# Patient Record
Sex: FEMALE | Race: WHITE | NOT HISPANIC OR LATINO | Employment: FULL TIME | URBAN - METROPOLITAN AREA
[De-identification: names, ages, dates, MRNs, and addresses within clinical notes are randomized per-mention and may not be internally consistent; named-entity substitution may affect disease eponyms.]

---

## 2017-01-05 ENCOUNTER — OFFICE VISIT (OUTPATIENT)
Dept: PEDIATRICS | Facility: CLINIC | Age: 57
End: 2017-01-05
Payer: COMMERCIAL

## 2017-01-05 VITALS
SYSTOLIC BLOOD PRESSURE: 110 MMHG | WEIGHT: 190 LBS | BODY MASS INDEX: 31.65 KG/M2 | DIASTOLIC BLOOD PRESSURE: 70 MMHG | HEART RATE: 84 BPM | OXYGEN SATURATION: 98 % | HEIGHT: 65 IN | TEMPERATURE: 98.7 F

## 2017-01-05 DIAGNOSIS — F41.9 ANXIETY: ICD-10-CM

## 2017-01-05 DIAGNOSIS — K21.9 GASTROESOPHAGEAL REFLUX DISEASE, ESOPHAGITIS PRESENCE NOT SPECIFIED: ICD-10-CM

## 2017-01-05 DIAGNOSIS — Z00.00 ROUTINE GENERAL MEDICAL EXAMINATION AT A HEALTH CARE FACILITY: Primary | ICD-10-CM

## 2017-01-05 DIAGNOSIS — E66.9 NON MORBID OBESITY, UNSPECIFIED OBESITY TYPE: ICD-10-CM

## 2017-01-05 DIAGNOSIS — R94.4 ABNORMAL RENAL FUNCTION TEST: ICD-10-CM

## 2017-01-05 DIAGNOSIS — F33.1 MAJOR DEPRESSIVE DISORDER, RECURRENT EPISODE, MODERATE (H): ICD-10-CM

## 2017-01-05 LAB
ALBUMIN SERPL-MCNC: 3.7 G/DL (ref 3.4–5)
ALP SERPL-CCNC: 96 U/L (ref 40–150)
ALT SERPL W P-5'-P-CCNC: 23 U/L (ref 0–50)
ANION GAP SERPL CALCULATED.3IONS-SCNC: 7 MMOL/L (ref 3–14)
AST SERPL W P-5'-P-CCNC: 16 U/L (ref 0–45)
BILIRUB SERPL-MCNC: 0.6 MG/DL (ref 0.2–1.3)
BUN SERPL-MCNC: 16 MG/DL (ref 7–30)
CALCIUM SERPL-MCNC: 9.5 MG/DL (ref 8.5–10.1)
CHLORIDE SERPL-SCNC: 107 MMOL/L (ref 94–109)
CHOLEST SERPL-MCNC: 222 MG/DL
CO2 SERPL-SCNC: 27 MMOL/L (ref 20–32)
CREAT SERPL-MCNC: 1.04 MG/DL (ref 0.52–1.04)
GFR SERPL CREATININE-BSD FRML MDRD: 54 ML/MIN/1.7M2
GLUCOSE SERPL-MCNC: 83 MG/DL (ref 70–99)
HDLC SERPL-MCNC: 54 MG/DL
LDLC SERPL CALC-MCNC: 153 MG/DL
NONHDLC SERPL-MCNC: 168 MG/DL
POTASSIUM SERPL-SCNC: 4.1 MMOL/L (ref 3.4–5.3)
PROT SERPL-MCNC: 7.4 G/DL (ref 6.8–8.8)
SODIUM SERPL-SCNC: 141 MMOL/L (ref 133–144)
TRIGL SERPL-MCNC: 73 MG/DL

## 2017-01-05 PROCEDURE — 99396 PREV VISIT EST AGE 40-64: CPT | Performed by: PEDIATRICS

## 2017-01-05 PROCEDURE — 80061 LIPID PANEL: CPT | Performed by: PEDIATRICS

## 2017-01-05 PROCEDURE — 36415 COLL VENOUS BLD VENIPUNCTURE: CPT | Performed by: PEDIATRICS

## 2017-01-05 PROCEDURE — 80053 COMPREHEN METABOLIC PANEL: CPT | Performed by: PEDIATRICS

## 2017-01-05 RX ORDER — MULTIPLE VITAMINS W/ MINERALS TAB 9MG-400MCG
1 TAB ORAL EVERY EVENING
COMMUNITY

## 2017-01-05 ASSESSMENT — ANXIETY QUESTIONNAIRES
6. BECOMING EASILY ANNOYED OR IRRITABLE: NOT AT ALL
2. NOT BEING ABLE TO STOP OR CONTROL WORRYING: NOT AT ALL
IF YOU CHECKED OFF ANY PROBLEMS ON THIS QUESTIONNAIRE, HOW DIFFICULT HAVE THESE PROBLEMS MADE IT FOR YOU TO DO YOUR WORK, TAKE CARE OF THINGS AT HOME, OR GET ALONG WITH OTHER PEOPLE: NOT DIFFICULT AT ALL
1. FEELING NERVOUS, ANXIOUS, OR ON EDGE: NOT AT ALL
GAD7 TOTAL SCORE: 0
3. WORRYING TOO MUCH ABOUT DIFFERENT THINGS: NOT AT ALL
5. BEING SO RESTLESS THAT IT IS HARD TO SIT STILL: NOT AT ALL
7. FEELING AFRAID AS IF SOMETHING AWFUL MIGHT HAPPEN: NOT AT ALL

## 2017-01-05 ASSESSMENT — PATIENT HEALTH QUESTIONNAIRE - PHQ9: 5. POOR APPETITE OR OVEREATING: NOT AT ALL

## 2017-01-05 NOTE — PATIENT INSTRUCTIONS
Labs today - this is on the first floor now    When you are due for your next refill on wellbutrin, let me know if you are interested in decreasing the dose          Preventive Health Recommendations  Female Ages 50 - 64    Yearly exam: See your health care provider every year in order to  o Review health changes.   o Discuss preventive care.    o Review your medicines if your doctor has prescribed any.      Get a Pap test every three years (unless you have an abnormal result and your provider advises testing more often).    If you get Pap tests with HPV test, you only need to test every 5 years, unless you have an abnormal result.     You do not need a Pap test if your uterus was removed (hysterectomy) and you have not had cancer.    You should be tested each year for STDs (sexually transmitted diseases) if you're at risk.     Have a mammogram every 1 to 2 years.    Have a colonoscopy at age 50, or have a yearly FIT test (stool test). These exams screen for colon cancer.      Have a cholesterol test every 5 years, or more often if advised.    Have a diabetes test (fasting glucose) every three years. If you are at risk for diabetes, you should have this test more often.     If you are at risk for osteoporosis (brittle bone disease), think about having a bone density scan (DEXA).    Shots: Get a flu shot each year. Get a tetanus shot every 10 years.    Nutrition:     Eat at least 5 servings of fruits and vegetables each day.    Eat whole-grain bread, whole-wheat pasta and brown rice instead of white grains and rice.    Talk to your provider about Calcium and Vitamin D.     Lifestyle    Exercise at least 150 minutes a week (30 minutes a day, 5 days a week). This will help you control your weight and prevent disease.    Limit alcohol to one drink per day.    No smoking.     Wear sunscreen to prevent skin cancer.     See your dentist every six months for an exam and cleaning.    See your eye doctor every 1 to 2  years.

## 2017-01-05 NOTE — PROGRESS NOTES
SUBJECTIVE:     CC: Sanjuanita Saul is an 56 year old woman who presents for preventive health visit.     Healthy Habits:    Do you get at least three servings of calcium containing foods daily (dairy, green leafy vegetables, etc.)? No , takes multi vitamin daily     Amount of exercise or daily activities, outside of work: none but active at work     Problems taking medications regularly No    Medication side effects: No    Have you had an eye exam in the past two years? no    Do you see a dentist twice per year? yes  Do you have sleep apnea, excessive snoring or daytime drowsiness?no      Anxiety and depression - well controlled on current medications.   Thinking about decreasing dose of wellbutrin.    Intermittent GERD symptoms in upper esophagus and hiccups - triggered by tomato containing foods.    Has lost weight with new job - walking more [place] [employer] [facility] [place]     Today's PHQ-2 Score:   PHQ-2 ( 1999 [employer]) 1/5/2017 10/8/2012   Q1: Little interest or pleasure in doing things 0 1   Q2: Feeling down, depressed or hopeless 0 1   PHQ-2 Score 0 2       Abuse: Current or Past(Physical, Sexual or Emotional)- No  Do you feel safe in your environment - Yes    Social History   Substance Use Topics     Smoking status: Never Smoker      Smokeless tobacco: Never Used     Alcohol Use: No     The patient does not drink >3 drinks per day nor >7 drinks per week.    Recent Labs   Lab Test  10/08/12   0854  06/30/11   0947   CHOL  218*  188   HDL  48*  39*   LDL  151*  126   TRIG  94  115   CHOLHDLRATIO  4.6  4.8       Reviewed orders with patient.  Reviewed health maintenance and updated orders accordingly - Yes    Mammo Decision Support:  Patient over age 50, mutual decision to screen reflected in health maintenance.    Pertinent mammograms are reviewed under the imaging tab.  History of abnormal Pap smear: no - has not tolerated exam when attempted in the past - has never been sexually active  All Histories reviewed and updated in  "Epic.      ROS:  C: NEGATIVE for fever, chills, change in weight  I: NEGATIVE for worrisome rashes, moles or lesions  E: NEGATIVE for vision changes or irritation  ENT: NEGATIVE for ear, mouth and throat problems  R: NEGATIVE for significant cough or SOB  B: NEGATIVE for masses, tenderness or discharge  CV: NEGATIVE for chest pain, palpitations or peripheral edema  GI: as above  : NEGATIVE for unusual urinary or vaginal symptoms. No vaginal bleeding.  M: NEGATIVE for significant arthralgias or myalgia  N: NEGATIVE for weakness, dizziness or paresthesias  P: NEGATIVE for changes in mood or affect     Problem list, Medication list, Allergies, and Medical/Social/Surgical histories reviewed in [facility] and updated as appropriate.  OBJECTIVE:     /70 mmHg  Pulse 84  Temp(Src) 98.7  F (37.1  C) (Tympanic)  Ht 5' 4.5\" (1.638 m)  Wt 190 lb (86.183 kg)  BMI 32.12 kg/m2  SpO2 98%  LMP 01/01/2010  EXAM:  GENERAL: healthy, alert and no distress  EYES: Eyes grossly normal to inspection, PERRL and conjunctivae and sclerae normal  HENT: ear canals and TM's normal, nose and mouth without ulcers or lesions  NECK: no adenopathy, no asymmetry, masses, or scars and thyroid normal to palpation  RESP: lungs clear to auscultation - no rales, rhonchi or wheezes  BREAST: normal without masses, tenderness or nipple discharge and no palpable axillary masses or adenopathy  CV: regular rate and rhythm, normal S1 S2, no S3 or S4, no murmur, click or rub, no peripheral edema and peripheral pulses strong  ABDOMEN: soft, nontender, no hepatosplenomegaly, no masses and bowel sounds normal  MS: no gross musculoskeletal defects noted, no edema  SKIN: no suspicious lesions or rashes  NEURO: Normal strength and tone, mentation intact and speech normal  PSYCH: mentation appears normal, affect normal/bright    ASSESSMENT/PLAN:         ICD-10-CM    1. Routine general medical examination at a health care facility Z00.00 Lipid panel reflex to " "direct LDL     Comprehensive metabolic panel   2. Major depressive disorder, recurrent episode, moderate (H) F33.1 Well controlled, continue current medications  To alert me when due for next refill and will consider changing wellbutrin to 150mg dosing   3. Anxiety F41.9 See above   4. Gastroesophageal reflux disease, esophagitis presence not specified K21.9 Intermittent and controlled with zantac as needed and avoidance of trigger foods - to alert me if worsening   5. Non morbid obesity, unspecified obesity type E66.9 Continue with increased activity - weight down from last visit       COUNSELING:   Special attention given to:        Regular exercise       Healthy diet/nutrition       Vision screening         reports that she has never smoked. She has never used smokeless tobacco.    Estimated body mass index is 32.12 kg/(m^2) as calculated from the following:    Height as of this encounter: 5' 4.5\" (1.638 m).    Weight as of this encounter: 190 lb (86.183 kg).   Weight management plan: Discussed healthy diet and exercise guidelines and patient will follow up in 12 months in clinic to re-evaluate.    Counseling Resources:  ATP IV Guidelines  Pooled Cohorts Equation Calculator  Breast Cancer Risk Calculator  FRAX Risk Assessment  ICSI Preventive Guidelines  Dietary Guidelines for Americans, 2010  USDA's MyPlate  ASA Prophylaxis  Lung CA Screening    Bernice Calderon MD  [facility]  "

## 2017-01-05 NOTE — MR AVS SNAPSHOT
After Visit Summary   1/5/2017    Sanjuanita Saul    MRN: [id number]           Patient Information     Date Of Birth          1960        Visit Information        Provider Department      1/5/2017 7:40 AM Bernice Calderon MD [facility]        Today's Diagnoses     Routine general medical examination at a health care facility    -  1     Major depressive disorder, recurrent episode, moderate (H)           Care Instructions    Labs today - this is on the first floor now    When you are due for your next refill on wellbutrin, let me know if you are interested in decreasing the dose          Preventive Health Recommendations  Female Ages 50 - 64    Yearly exam: See your health care provider every year in order to  o Review health changes.   o Discuss preventive care.    o Review your medicines if your doctor has prescribed any.      Get a Pap test every three years (unless you have an abnormal result and your provider advises testing more often).    If you get Pap tests with HPV test, you only need to test every 5 years, unless you have an abnormal result.     You do not need a Pap test if your uterus was removed (hysterectomy) and you have not had cancer.    You should be tested each year for STDs (sexually transmitted diseases) if you're at risk.     Have a mammogram every 1 to 2 years.    Have a colonoscopy at age 50, or have a yearly FIT test (stool test). These exams screen for colon cancer.      Have a cholesterol test every 5 years, or more often if advised.    Have a diabetes test (fasting glucose) every three years. If you are at risk for diabetes, you should have this test more often.     If you are at risk for osteoporosis (brittle bone disease), think about having a bone density scan (DEXA).    Shots: Get a flu shot each year. Get a tetanus shot every 10 years.    Nutrition:     Eat at least 5 servings of fruits and vegetables each day.    Eat whole-grain bread, whole-wheat  "pasta and brown rice instead of white grains and rice.    Talk to your provider about Calcium and Vitamin D.     Lifestyle    Exercise at least 150 minutes a week (30 minutes a day, 5 days a week). This will help you control your weight and prevent disease.    Limit alcohol to one drink per day.    No smoking.     Wear sunscreen to prevent skin cancer.     See your dentist every six months for an exam and cleaning.    See your eye doctor every 1 to 2 years.            Follow-ups after your visit        Who to contact     If you have questions or need follow up information about today's clinic visit or your schedule please contact [facility] directly at [phone].  Normal or non-critical lab and imaging results will be communicated to you by Varoliihart, letter or phone within 4 business days after the clinic has received the results. If you do not hear from us within 7 days, please contact the clinic through AdStaget or phone. If you have a critical or abnormal lab result, we will notify you by phone as soon as possible.  Submit refill requests through Directr or call your pharmacy and they will forward the refill request to us. Please allow 3 business days for your refill to be completed.          Additional Information About Your Visit        Directr Information     Directr gives you secure access to your electronic health record. If you see a primary care provider, you can also send messages to your care team and make appointments. If you have questions, please call your primary care clinic.  If you do not have a primary care provider, please call [phone] and they will assist you.        Care EveryWhere ID     This is your Care EveryWhere ID. This could be used by other organizations to access your [facility] medical records  [id number]        Your Vitals Were     Pulse Temperature Height BMI (Body Mass Index) Pulse Oximetry Last Period    84 98.7  F (37.1  C) (Tympanic) 5' 4.5\" (1.638 m) 32.12 " kg/m2 98% 01/01/2010       Blood Pressure from Last 3 Encounters:   01/05/17 110/70   07/11/16 120/82   06/25/15 122/82    Weight from Last 3 Encounters:   01/05/17 190 lb (86.183 kg)   07/11/16 198 lb (89.812 kg)   06/25/15 201 lb (91.173 kg)              We Performed the Following     Comprehensive metabolic panel     Lipid panel reflex to direct LDL        Primary Care Provider Office Phone # Fax #    Bernice Calderon [phone]       [facility] [address] [place] [address]        Thank you!     Thank you for choosing [facility]  for your care. Our goal is always to provide you with excellent care. Hearing back from our patients is one way we can continue to improve our services. Please take a few minutes to complete the written survey that you may receive in the mail after your visit with us. Thank you!             Your Updated Medication List - Protect others around you: Learn how to safely use, store and throw away your medicines at www.disposemymeds.org.          This list is accurate as of: 1/5/17  8:07 AM.  Always use your most recent med list.                   Brand Name Dispense Instructions for use    aspirin 325 MG tablet      2 TABLETS EVERY 4 TO 6 HOURS AS NEEDED       buPROPion 300 MG 24 hr tablet    WELLBUTRIN XL    90 tablet    Take 1 tablet (300 mg) by mouth every morning       escitalopram 20 MG tablet    LEXAPRO    90 tablet    Take 1 tablet (20 mg) by mouth daily       Multi-vitamin Tabs tablet      Take 1 tablet by mouth daily       OMEGA-3 FISH OIL PO          pseudoePHEDrine 30 MG tablet    SUDAFED    30 tablet    Take  by mouth every 4 hours as needed for congestion.

## 2017-01-05 NOTE — NURSING NOTE
"Chief Complaint   Patient presents with     Physical       Initial /70 mmHg  Pulse 84  Temp(Src) 98.7  F (37.1  C) (Tympanic)  Ht 5' 4.5\" (1.638 m)  Wt 190 lb (86.183 kg)  BMI 32.12 kg/m2  SpO2 98%  LMP 01/01/2010 Estimated body mass index is 32.12 kg/(m^2) as calculated from the following:    Height as of this encounter: 5' 4.5\" (1.638 m).    Weight as of this encounter: 190 lb (86.183 kg).  BP completed using cuff size: large    "

## 2017-01-06 ASSESSMENT — PATIENT HEALTH QUESTIONNAIRE - PHQ9: SUM OF ALL RESPONSES TO PHQ QUESTIONS 1-9: 3

## 2017-01-06 ASSESSMENT — ANXIETY QUESTIONNAIRES: GAD7 TOTAL SCORE: 0

## 2017-02-20 DIAGNOSIS — R94.4 ABNORMAL RENAL FUNCTION TEST: ICD-10-CM

## 2017-02-20 PROCEDURE — 80048 BASIC METABOLIC PNL TOTAL CA: CPT | Performed by: PEDIATRICS

## 2017-02-20 PROCEDURE — 36415 COLL VENOUS BLD VENIPUNCTURE: CPT | Performed by: PEDIATRICS

## 2017-02-21 LAB
ANION GAP SERPL CALCULATED.3IONS-SCNC: 8 MMOL/L (ref 3–14)
BUN SERPL-MCNC: 16 MG/DL (ref 7–30)
CALCIUM SERPL-MCNC: 9.4 MG/DL (ref 8.5–10.1)
CHLORIDE SERPL-SCNC: 107 MMOL/L (ref 94–109)
CO2 SERPL-SCNC: 27 MMOL/L (ref 20–32)
CREAT SERPL-MCNC: 0.98 MG/DL (ref 0.52–1.04)
GFR SERPL CREATININE-BSD FRML MDRD: 58 ML/MIN/1.7M2
GLUCOSE SERPL-MCNC: 83 MG/DL (ref 70–99)
POTASSIUM SERPL-SCNC: 4.1 MMOL/L (ref 3.4–5.3)
SODIUM SERPL-SCNC: 142 MMOL/L (ref 133–144)

## 2017-03-09 PROBLEM — E66.9 NON MORBID OBESITY, UNSPECIFIED OBESITY TYPE: Status: ACTIVE | Noted: 2017-01-05

## 2017-03-09 PROBLEM — E66.9 NON MORBID OBESITY, UNSPECIFIED OBESITY TYPE: Status: ACTIVE | Noted: 2017-03-09

## 2017-08-03 DIAGNOSIS — F33.1 MAJOR DEPRESSIVE DISORDER, RECURRENT EPISODE, MODERATE (H): ICD-10-CM

## 2017-08-03 NOTE — TELEPHONE ENCOUNTER
buPROPion (WELLBUTRIN XL) 300 MG       Last Written Prescription Date: 7/11/17  Last Fill Quantity: 90; # refills: 3  Last Office Visit with [facility], [facility] or [facility] prescribing provider:  1/5/17        Last PHQ-9 score on record=   PHQ-9 SCORE 8/3/2017   Total Score -   Total Score MyChart 1 (Minimal depression)   Total Score 1       Lab Results   Component Value Date    AST 16 01/05/2017     Lab Results   Component Value Date    ALT 23 01/05/2017       escitalopram (LEXAPRO) 20 MG     Last Written Prescription Date: 7/11/16  Last Fill Quantity: 90, # refills: 3  Last Office Visit with [facility] primary care provider:  1/5/17        Last PHQ-9 score on record=   PHQ-9 SCORE 8/3/2017   Total Score -   Total Score MyChart 1 (Minimal depression)   Total Score 1

## 2017-08-04 ENCOUNTER — RADIANT APPOINTMENT (OUTPATIENT)
Dept: MAMMOGRAPHY | Facility: CLINIC | Age: 57
End: 2017-08-04
Attending: PEDIATRICS
Payer: COMMERCIAL

## 2017-08-04 DIAGNOSIS — Z12.31 VISIT FOR SCREENING MAMMOGRAM: ICD-10-CM

## 2017-08-04 PROCEDURE — G0202 SCR MAMMO BI INCL CAD: HCPCS | Mod: TC

## 2017-08-07 RX ORDER — BUPROPION HYDROCHLORIDE 300 MG/1
TABLET ORAL
Qty: 90 TABLET | Refills: 3 | OUTPATIENT
Start: 2017-08-07

## 2017-08-07 RX ORDER — ESCITALOPRAM OXALATE 20 MG/1
TABLET ORAL
Qty: 90 TABLET | Refills: 3 | OUTPATIENT
Start: 2017-08-07

## 2017-08-07 NOTE — TELEPHONE ENCOUNTER
Chart review confirms medication was sent 8/3/17 to requesting pharmacy, 6 month supply. Sent back as denied/duplicate.    Vicenta Santo, MSN, RN-BC  Care Coordinator

## 2017-08-15 NOTE — TELEPHONE ENCOUNTER
Looks like there was an E-scribe Error.    Confirmed with pharmacy. They did receive refills.     Kamila RIVERA RN, BSN, PHN  [facility] RN

## 2018-02-15 ENCOUNTER — MYC MEDICAL ADVICE (OUTPATIENT)
Dept: PEDIATRICS | Facility: CLINIC | Age: 58
End: 2018-02-15

## 2018-02-15 DIAGNOSIS — F33.1 MAJOR DEPRESSIVE DISORDER, RECURRENT EPISODE, MODERATE (H): ICD-10-CM

## 2018-02-15 RX ORDER — ESCITALOPRAM OXALATE 20 MG/1
20 TABLET ORAL DAILY
Qty: 30 TABLET | Refills: 0 | Status: SHIPPED | OUTPATIENT
Start: 2018-02-15 | End: 2018-02-28

## 2018-02-15 RX ORDER — BUPROPION HYDROCHLORIDE 300 MG/1
TABLET ORAL
Qty: 30 TABLET | Refills: 0 | Status: SHIPPED | OUTPATIENT
Start: 2018-02-15 | End: 2018-02-28

## 2018-02-15 NOTE — TELEPHONE ENCOUNTER
Patient due for annual office visit & PHQ9.     Prescription refilled x 1 per [facility] Refill Protocol.     Sent Convoe message for patient to schedule appointment.     PHQ-9 SCORE 7/11/2016 1/5/2017 8/3/2017   Total Score - - -   Total Score MyChart - - 1 (Minimal depression)   Total Score 4 3 1

## 2018-02-19 DIAGNOSIS — F33.1 MAJOR DEPRESSIVE DISORDER, RECURRENT EPISODE, MODERATE (H): ICD-10-CM

## 2018-02-23 RX ORDER — ESCITALOPRAM OXALATE 20 MG/1
TABLET ORAL
Qty: 90 TABLET | Refills: 1 | OUTPATIENT
Start: 2018-02-23

## 2018-02-23 RX ORDER — BUPROPION HYDROCHLORIDE 300 MG/1
TABLET ORAL
Qty: 90 TABLET | Refills: 1 | OUTPATIENT
Start: 2018-02-23

## 2018-02-23 NOTE — TELEPHONE ENCOUNTER
escitalopram (LEXAPRO) 20 MG tablet  Rx was sent 02/15/2018 for 30 tabs and 0 refills.   Pharmacy notified via E-prescribe refusal.  Enma Crespo RN, BSN     buPROPion (WELLBUTRIN XL) 300 MG 24 hr tablet  Rx was sent 02/15/2018 for 30 tabs and 0 refills.   Pharmacy notified via E-prescribe refusal.  Enma Crespo RN, BSN     Next 5 appointments (look out 90 days)     Feb 28, 2018  1:40 PM CST   [facility]    [address] [place] [address]  [address]  [facility] [address] [phone]                Enma Crespo RN, BSN

## 2018-02-28 ENCOUNTER — OFFICE VISIT (OUTPATIENT)
Dept: PEDIATRICS | Facility: CLINIC | Age: 58
End: 2018-02-28
Payer: COMMERCIAL

## 2018-02-28 VITALS
DIASTOLIC BLOOD PRESSURE: 80 MMHG | HEIGHT: 64 IN | TEMPERATURE: 98.5 F | WEIGHT: 202 LBS | OXYGEN SATURATION: 95 % | HEART RATE: 99 BPM | BODY MASS INDEX: 34.49 KG/M2 | SYSTOLIC BLOOD PRESSURE: 116 MMHG

## 2018-02-28 DIAGNOSIS — K21.9 GASTROESOPHAGEAL REFLUX DISEASE, ESOPHAGITIS PRESENCE NOT SPECIFIED: ICD-10-CM

## 2018-02-28 DIAGNOSIS — F41.9 ANXIETY: ICD-10-CM

## 2018-02-28 DIAGNOSIS — F33.1 MAJOR DEPRESSIVE DISORDER, RECURRENT EPISODE, MODERATE (H): Primary | ICD-10-CM

## 2018-02-28 PROCEDURE — 99213 OFFICE O/P EST LOW 20 MIN: CPT | Performed by: PEDIATRICS

## 2018-02-28 RX ORDER — ESCITALOPRAM OXALATE 20 MG/1
20 TABLET ORAL DAILY
Qty: 90 TABLET | Refills: 3 | Status: SHIPPED | OUTPATIENT
Start: 2018-02-28 | End: 2019-04-04

## 2018-02-28 RX ORDER — BUPROPION HYDROCHLORIDE 300 MG/1
TABLET ORAL
Qty: 90 TABLET | Refills: 3 | Status: SHIPPED | OUTPATIENT
Start: 2018-02-28 | End: 2018-04-10 | Stop reason: DRUGHIGH

## 2018-02-28 ASSESSMENT — ANXIETY QUESTIONNAIRES
2. NOT BEING ABLE TO STOP OR CONTROL WORRYING: NOT AT ALL
GAD7 TOTAL SCORE: 2
3. WORRYING TOO MUCH ABOUT DIFFERENT THINGS: NOT AT ALL
5. BEING SO RESTLESS THAT IT IS HARD TO SIT STILL: NOT AT ALL
6. BECOMING EASILY ANNOYED OR IRRITABLE: MORE THAN HALF THE DAYS
1. FEELING NERVOUS, ANXIOUS, OR ON EDGE: NOT AT ALL
IF YOU CHECKED OFF ANY PROBLEMS ON THIS QUESTIONNAIRE, HOW DIFFICULT HAVE THESE PROBLEMS MADE IT FOR YOU TO DO YOUR WORK, TAKE CARE OF THINGS AT HOME, OR GET ALONG WITH OTHER PEOPLE: NOT DIFFICULT AT ALL
7. FEELING AFRAID AS IF SOMETHING AWFUL MIGHT HAPPEN: NOT AT ALL

## 2018-02-28 ASSESSMENT — PATIENT HEALTH QUESTIONNAIRE - PHQ9: 5. POOR APPETITE OR OVEREATING: NOT AT ALL

## 2018-02-28 NOTE — LETTER
My Depression Action Plan  Name: Sanjuanita Saul   Date of Birth 1960  Date: 2/28/2018    My doctor: Bernice Calderon   My clinic: [address]  [address]  [facility] [address] [phone]          GREEN    ZONE   Good Control    What it looks like:     Things are going generally well. You have normal up s and down s. You may even feel depressed from time to time, but bad moods usually last less than a day.   What you need to do:  1. Continue to care for yourself (see self care plan)  2. Check your depression survival kit and update it as needed  3. Follow your physician s recommendations including any medication.  4. Do not stop taking medication unless you consult with your physician first.           YELLOW         ZONE Getting Worse    What it looks like:     Depression is starting to interfere with your life.     It may be hard to get out of bed; you may be starting to isolate yourself from others.    Symptoms of depression are starting to last most all day and this has happened for several days.     You may have suicidal thoughts but they are not constant.   What you need to do:     1. Call your care team, your response to treatment will improve if you keep your care team informed of your progress. Yellow periods are signs an adjustment may need to be made.     2. Continue your self-care, even if you have to fake it!    3. Talk to someone in your support network    4. Open up your depression survival kit           RED    ZONE Medical Alert - Get Help    What it looks like:     Depression is seriously interfering with your life.     You may experience these or other symptoms: You can t get out of bed most days, can t work or engage in other necessary activities, you have trouble taking care of basic hygiene, or basic responsibilities, thoughts of suicide or death that will not go away, self-injurious behavior.     What you need to do:  1. Call your  care team and request a same-day appointment. If they are not available (weekends or after hours) call your local crisis line, emergency room or 911.      Electronically signed by: Kerri Yoo, February 28, 2018    Depression Self Care Plan / Survival Kit    Self-Care for Depression  Here s the deal. Your body and mind are really not as separate as most people think.  What you do and think affects how you feel and how you feel influences what you do and think. This means if you do things that people who feel good do, it will help you feel better.  Sometimes this is all it takes.  There is also a place for medication and therapy depending on how severe your depression is, so be sure to consult with your medical provider and/ or Behavioral Health Consultant if your symptoms are worsening or not improving.     In order to better manage my stress, I will:    Exercise  Get some form of exercise, every day. This will help reduce pain and release endorphins, the  feel good  chemicals in your brain. This is almost as good as taking antidepressants!  This is not the same as joining a gym and then never going! (they count on that by the way ) It can be as simple as just going for a walk or doing some gardening, anything that will get you moving.      Hygiene   Maintain good hygiene (Get out of bed in the morning, Make your bed, Brush your teeth, Take a shower, and Get dressed like you were going to work, even if you are unemployed).  If your clothes don't fit try to get ones that do.    Diet  I will strive to eat foods that are good for me, drink plenty of water, and avoid excessive sugar, caffeine, alcohol, and other mood-altering substances.  Some foods that are helpful in depression are: complex carbohydrates, B vitamins, flaxseed, fish or fish oil, fresh fruits and vegetables.    Psychotherapy  I agree to participate in Individual Therapy (if recommended).    Medication  If prescribed medications, I agree to  take them.  Missing doses can result in serious side effects.  I understand that drinking alcohol, or other illicit drug use, may cause potential side effects.  I will not stop my medication abruptly without first discussing it with my provider.    Staying Connected With Others  I will stay in touch with my friends, family members, and my primary care provider/team.    Use your imagination  Be creative.  We all have a creative side; it doesn t matter if it s oil painting, sand castles, or mud pies! This will also kick up the endorphins.    Witness Beauty  (AKA stop and smell the roses) Take a look outside, even in mid-winter. Notice colors, textures. Watch the squirrels and birds.     Service to others  Be of service to others.  There is always someone else in need.  By helping others we can  get out of ourselves  and remember the really important things.  This also provides opportunities for practicing all the other parts of the program.    Humor  Laugh and be silly!  Adjust your TV habits for less news and crime-drama and more comedy.    Control your stress  Try breathing deep, massage therapy, biofeedback, and meditation. Find time to relax each day.     My support system    Clinic Contact:  Phone number:    Contact 1:  Phone number:    Contact 2:  Phone number:    [facility]/:  Phone number:    Therapist:  Phone number:    Local crisis center:    Phone number:    Other community support:  Phone number:

## 2018-02-28 NOTE — MR AVS SNAPSHOT
After Visit Summary   2/28/2018    Sanjuanita Saul    MRN: [id number]           Patient Information     Date Of Birth          1960        Visit Information        Provider Department      2/28/2018 1:40 PM Bernice Calderon MD [facility]        Today's Diagnoses     Major depressive disorder, recurrent episode, moderate (H)          Care Instructions    Continue current medications for now.    Expect a phone call to help schedule with psychiatry.             Follow-ups after your visit        Additional Services     MENTAL HEALTH REFERRAL  - Adult; Psychiatry and Medication Management; Psychiatry; Other: [facility] [phone]; We will contact you to schedule the appointment or please call with any questions       All scheduling is subject to the client's specific insurance plan & benefits, provider/location availability, and provider clinical specialities.  Please arrive 15 minutes early for your first appointment and bring your completed paperwork.    Please be aware that coverage of these services is subject to the terms and limitations of your health insurance plan.  Call member services at your health plan with any benefit or coverage questions.                            Who to contact     If you have questions or need follow up information about today's clinic visit or your schedule please contact [facility] directly at [phone].  Normal or non-critical lab and imaging results will be communicated to you by MyChart, letter or phone within 4 business days after the clinic has received the results. If you do not hear from us within 7 days, please contact the clinic through MyChart or phone. If you have a critical or abnormal lab result, we will notify you by phone as soon as possible.  Submit refill requests through [employer] or call your pharmacy and they will forward the refill request to us. Please allow 3 business days for your refill to  "be completed.          Additional Information About Your Visit        MemoryMergehart Information     [employer] gives you secure access to your electronic health record. If you see a primary care provider, you can also send messages to your care team and make appointments. If you have questions, please call your primary care clinic.  If you do not have a primary care provider, please call [phone] and they will assist you.        Care EveryWhere ID     This is your Care EveryWhere ID. This could be used by other organizations to access your [place] medical records  [id number]        Your Vitals Were     Pulse Temperature Height Last Period Pulse Oximetry BMI (Body Mass Index)    99 98.5  F (36.9  C) (Tympanic) 5' 3.5\" (1.613 m) 01/01/2010 95% 35.22 kg/m2       Blood Pressure from Last 3 Encounters:   02/28/18 116/80   01/05/17 110/70   07/11/16 120/82    Weight from Last 3 Encounters:   02/28/18 202 lb (91.6 kg)   01/05/17 190 lb (86.2 kg)   07/11/16 198 lb (89.8 kg)              We Performed the Following     DEPRESSION ACTION PLAN (DAP)     MENTAL HEALTH REFERRAL  - Adult; Psychiatry and Medication Management; Psychiatry; Other: [facility] [phone]; We will contact you to schedule the appointment or please call with any questions          Where to get your medicines      These medications were sent to [facility] - [address], [facility] [address]     Phone:  [phone]     buPROPion 300 MG 24 hr tablet    escitalopram 20 MG tablet          Primary Care Provider Office Phone # Fax #    Bernice Calderon [phone] [address] [place] [address]        Equal Access to Services     CYNTHIA GUILLEN AH: Brittney Zaragoza, cadence escoto, jaiden dent. So [facility] [phone].    ATENCIÓN: Si chrissy sargent, " tiene a kamara disposición servicios gratuitos de asistencia lingüística. Clara lopez [phone].    We comply with applicable federal civil rights laws and [place] laws. We do not discriminate on the basis of race, color, national origin, age, disability, sex, sexual orientation, or gender identity.            Thank you!     Thank you for choosing [facility]  for your care. Our goal is always to provide you with excellent care. Hearing back from our patients is one way we can continue to improve our services. Please take a few minutes to complete the written survey that you may receive in the mail after your visit with us. Thank you!             Your Updated Medication List - Protect others around you: Learn how to safely use, store and throw away your medicines at www.disposemymeds.org.          This list is accurate as of 2/28/18  2:11 PM.  Always use your most recent med list.                   Brand Name Dispense Instructions for use Diagnosis    aspirin 325 MG tablet      2 TABLETS EVERY 4 TO 6 HOURS AS NEEDED        buPROPion 300 MG 24 hr tablet    WELLBUTRIN XL    90 tablet    Take 1 tablet (300 mg) by mouth daily every morning.    Major depressive disorder, recurrent episode, moderate (H)       escitalopram 20 MG tablet    LEXAPRO    90 tablet    Take 1 tablet (20 mg) by mouth daily    Major depressive disorder, recurrent episode, moderate (H)       Multi-vitamin Tabs tablet      Take 1 tablet by mouth daily        OMEGA-3 FISH OIL PO           pseudoePHEDrine 30 MG tablet    SUDAFED    30 tablet    Take  by mouth every 4 hours as needed for congestion.

## 2018-02-28 NOTE — PROGRESS NOTES
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    Hasn't been seeing her therapist recently, but feels that she is doing well overall    Significant life event: No     Current substance abuse: None    PHQ-9 1/5/2017 8/3/2017 2/28/2018   Total Score 3 1 2   Q9: Suicide Ideation Not at all Not at all Not at all     YURIY-7 SCORE 7/11/2016 1/5/2017 2/28/2018   Total Score - - -   Total Score 0 0 2     In the past two weeks have you had thoughts of suicide or self-harm?  No.    Do you have concerns about your personal safety or the safety of others?   No  PHQ-9  English  PHQ-9   Any Language  YURIY-7  Suicide Assessment Five-step Evaluation and Treatment (SAFE-T)    Amount of exercise or physical activity: None    Problems taking medications regularly: No    Medication side effects: none    Diet: regular (no restrictions)        Self care has been more difficult with intense school and work schedule.  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    Having some increase in reflux symptoms recently.  More burping.  Resolves with tums.  Intermittent zantac use.    Wt Readings from Last 4 Encounters:   02/28/18 202 lb (91.6 kg)   01/05/17 190 lb (86.2 kg)   07/11/16 198 lb (89.8 kg)   06/25/15 201 lb (91.2 kg)     Weight up about 12 pounds from our last visit.    Still enjoys seeing her horse and visits her weekly.    Problem list and histories reviewed & adjusted, as indicated.  Additional history:  "as documented      Reviewed and updated as needed this visit by clinical staff       Reviewed and updated as needed this visit by Provider         OBJECTIVE:     /80 (BP Location: Right arm, Patient Position: Right side, Cuff Size: Adult Large)  Pulse 99  Temp 98.5  F (36.9  C) (Tympanic)  Ht 5' 3.5\" (1.613 m)  Wt 202 lb (91.6 kg)  LMP 01/01/2010  SpO2 95%  BMI 35.22 kg/m2  Body mass index is 35.22 kg/(m^2).  GENERAL: alert and no distress  PSYCH: mentation appears normal, affect normal/bright      ASSESSMENT/PLAN:       ICD-10-CM    1. Major depressive disorder, recurrent episode, moderate (H) F33.1 buPROPion (WELLBUTRIN XL) 300 MG 24 hr tablet     escitalopram (LEXAPRO) 20 MG tablet     MENTAL HEALTH REFERRAL  - Adult; Psychiatry and Medication Management; Psychiatry; Other: [facility] [phone]; We will contact you to schedule the appointment or please call with any questions    Continue current medications for now.  Anticipate increase in self care once done with school.  Referral placed for psychiatry per patient request for medication review.   2. Anxiety F41.9 See above - continue current medications - well controlled   3. Gastroesophageal reflux disease, esophagitis presence not specified K21.9 Intermittent, discussed dietary changes, OTC medications to use.  To alert me if worsening.         Bernice Calderon MD  [facility]  "

## 2018-03-01 ASSESSMENT — PATIENT HEALTH QUESTIONNAIRE - PHQ9: SUM OF ALL RESPONSES TO PHQ QUESTIONS 1-9: 2

## 2018-03-01 ASSESSMENT — ANXIETY QUESTIONNAIRES: GAD7 TOTAL SCORE: 2

## 2018-04-08 ENCOUNTER — MYC MEDICAL ADVICE (OUTPATIENT)
Dept: PEDIATRICS | Facility: CLINIC | Age: 58
End: 2018-04-08

## 2018-04-08 DIAGNOSIS — F32.1 MODERATE MAJOR DEPRESSION (H): Primary | ICD-10-CM

## 2018-04-10 ENCOUNTER — MYC MEDICAL ADVICE (OUTPATIENT)
Dept: PEDIATRICS | Facility: CLINIC | Age: 58
End: 2018-04-10

## 2018-04-10 RX ORDER — BUPROPION HYDROCHLORIDE 150 MG/1
150 TABLET ORAL EVERY MORNING
Qty: 90 TABLET | Refills: 3 | Status: SHIPPED | OUTPATIENT
Start: 2018-04-10 | End: 2021-02-24

## 2018-04-10 NOTE — TELEPHONE ENCOUNTER
Per Dash[employer] message, patient is on 450mg of Bupropion as increased by Candie Holder and [facility].     Per patient, that provider will not take over prescribing.    Please advise if you would be managing medication at this time. Or if we should obtain records. Patient will need a refill soon    Kamila RIVERA RN, BSN, PHN  [place] [facility] RN

## 2018-08-27 ENCOUNTER — RADIANT APPOINTMENT (OUTPATIENT)
Dept: MAMMOGRAPHY | Facility: CLINIC | Age: 58
End: 2018-08-27
Payer: COMMERCIAL

## 2018-08-27 DIAGNOSIS — Z12.31 VISIT FOR SCREENING MAMMOGRAM: ICD-10-CM

## 2018-08-27 PROCEDURE — 77067 SCR MAMMO BI INCL CAD: CPT | Mod: TC

## 2019-04-04 ENCOUNTER — MYC MEDICAL ADVICE (OUTPATIENT)
Dept: PEDIATRICS | Facility: CLINIC | Age: 59
End: 2019-04-04

## 2019-04-04 ENCOUNTER — OFFICE VISIT (OUTPATIENT)
Dept: PEDIATRICS | Facility: CLINIC | Age: 59
End: 2019-04-04
Payer: COMMERCIAL

## 2019-04-04 VITALS
HEIGHT: 64 IN | BODY MASS INDEX: 33.29 KG/M2 | HEART RATE: 86 BPM | SYSTOLIC BLOOD PRESSURE: 128 MMHG | WEIGHT: 195 LBS | OXYGEN SATURATION: 97 % | DIASTOLIC BLOOD PRESSURE: 86 MMHG | TEMPERATURE: 96.8 F

## 2019-04-04 DIAGNOSIS — Z23 NEED FOR SHINGLES VACCINE: ICD-10-CM

## 2019-04-04 DIAGNOSIS — Z23 NEED FOR TDAP VACCINATION: ICD-10-CM

## 2019-04-04 DIAGNOSIS — Z12.11 COLON CANCER SCREENING: Primary | ICD-10-CM

## 2019-04-04 DIAGNOSIS — K21.9 GASTROESOPHAGEAL REFLUX DISEASE, ESOPHAGITIS PRESENCE NOT SPECIFIED: ICD-10-CM

## 2019-04-04 DIAGNOSIS — Z00.00 ROUTINE HISTORY AND PHYSICAL EXAMINATION OF ADULT: Primary | ICD-10-CM

## 2019-04-04 DIAGNOSIS — F32.1 MODERATE MAJOR DEPRESSION (H): ICD-10-CM

## 2019-04-04 DIAGNOSIS — F41.9 ANXIETY: ICD-10-CM

## 2019-04-04 DIAGNOSIS — R03.0 ELEVATED BLOOD PRESSURE READING WITHOUT DIAGNOSIS OF HYPERTENSION: ICD-10-CM

## 2019-04-04 DIAGNOSIS — F33.1 MAJOR DEPRESSIVE DISORDER, RECURRENT EPISODE, MODERATE (H): ICD-10-CM

## 2019-04-04 PROCEDURE — 90472 IMMUNIZATION ADMIN EACH ADD: CPT | Performed by: PEDIATRICS

## 2019-04-04 PROCEDURE — 90750 HZV VACC RECOMBINANT IM: CPT | Performed by: PEDIATRICS

## 2019-04-04 PROCEDURE — 90471 IMMUNIZATION ADMIN: CPT | Performed by: PEDIATRICS

## 2019-04-04 PROCEDURE — 90715 TDAP VACCINE 7 YRS/> IM: CPT | Performed by: PEDIATRICS

## 2019-04-04 PROCEDURE — 99396 PREV VISIT EST AGE 40-64: CPT | Mod: 25 | Performed by: PEDIATRICS

## 2019-04-04 RX ORDER — ESCITALOPRAM OXALATE 20 MG/1
20 TABLET ORAL DAILY
Qty: 90 TABLET | Refills: 3 | Status: SHIPPED | OUTPATIENT
Start: 2019-04-04 | End: 2020-05-13

## 2019-04-04 RX ORDER — BUPROPION HYDROCHLORIDE 300 MG/1
300 TABLET ORAL EVERY MORNING
Qty: 90 TABLET | Refills: 3 | Status: SHIPPED | OUTPATIENT
Start: 2019-04-04 | End: 2020-05-13

## 2019-04-04 ASSESSMENT — ENCOUNTER SYMPTOMS
BREAST MASS: 0
PALPITATIONS: 0
CHILLS: 0
EYE PAIN: 0
PARESTHESIAS: 0
DIZZINESS: 0
WEAKNESS: 0
CONSTIPATION: 0
HEMATOCHEZIA: 0
SORE THROAT: 0
DIARRHEA: 0
HEMATURIA: 0
ABDOMINAL PAIN: 0
COUGH: 0
SHORTNESS OF BREATH: 0

## 2019-04-04 ASSESSMENT — ANXIETY QUESTIONNAIRES
1. FEELING NERVOUS, ANXIOUS, OR ON EDGE: NOT AT ALL
2. NOT BEING ABLE TO STOP OR CONTROL WORRYING: NOT AT ALL
6. BECOMING EASILY ANNOYED OR IRRITABLE: NOT AT ALL
7. FEELING AFRAID AS IF SOMETHING AWFUL MIGHT HAPPEN: NOT AT ALL
IF YOU CHECKED OFF ANY PROBLEMS ON THIS QUESTIONNAIRE, HOW DIFFICULT HAVE THESE PROBLEMS MADE IT FOR YOU TO DO YOUR WORK, TAKE CARE OF THINGS AT HOME, OR GET ALONG WITH OTHER PEOPLE: NOT DIFFICULT AT ALL
5. BEING SO RESTLESS THAT IT IS HARD TO SIT STILL: NOT AT ALL
3. WORRYING TOO MUCH ABOUT DIFFERENT THINGS: NOT AT ALL
GAD7 TOTAL SCORE: 0

## 2019-04-04 ASSESSMENT — PATIENT HEALTH QUESTIONNAIRE - PHQ9
5. POOR APPETITE OR OVEREATING: NOT AT ALL
SUM OF ALL RESPONSES TO PHQ QUESTIONS 1-9: 2

## 2019-04-04 ASSESSMENT — MIFFLIN-ST. JEOR: SCORE: 1443.48

## 2019-04-04 NOTE — PROGRESS NOTES
SUBJECTIVE:   CC: Sanjuanita Saul is an 58 year old woman who presents for preventive health visit.     Physical   Annual:     Getting at least 3 servings of Calcium per day:  Yes    Bi-annual eye exam:  Yes    Dental care twice a year:  Yes    Sleep apnea or symptoms of sleep apnea:  None    Diet:  Regular (no restrictions)    Frequency of exercise:  2-3 days/week    Duration of exercise:  15-30 minutes    Taking medications regularly:  Yes    Medication side effects:  None    Additional concerns today:  YES (Discuss possible HTN if time allows.)    PHQ-2 Total Score: 0        Today's PHQ-2 Score:   PHQ-2 ( 1999 Pfizer) 4/4/2019   Q1: Little interest or pleasure in doing things 0   Q2: Feeling down, depressed or hopeless 0   PHQ-2 Score 0   Q1: Little interest or pleasure in doing things Not at all   Q2: Feeling down, depressed or hopeless Not at all   PHQ-2 Score 0       Abuse: Current or Past(Physical, Sexual or Emotional)- No  Do you feel safe in your environment? Yes    Social History     Tobacco Use     Smoking status: Never Smoker     Smokeless tobacco: Never Used   Substance Use Topics     Alcohol use: No     Alcohol Use 4/4/2019   If you drink alcohol do you typically have greater than 3 drinks per day OR greater than 7 drinks per week? Not Applicable       Reviewed orders with patient.  Reviewed health maintenance and updated orders accordingly - Yes    Mammogram Screening: Patient over age 50, mutual decision to screen reflected in health maintenance.    Pertinent mammograms are reviewed under the imaging tab.  History of abnormal Pap smear: No - all normal in the past - patient has not been sexually active and declines additional pap smears at this time     Reviewed and updated as needed this visit by clinical staff  Tobacco  Allergies  Meds  Med Hx  Surg Hx  Fam Hx  Soc Hx        Reviewed and updated as needed this visit by Provider          Depression/anxiety - had been on wellbutrin 450 - cut  "down due to 300mg due to bp concerns.   Has had evaluations through psychiatry for consultation, then I have been prescribing medications.  Mood has been in a good place.  PHQ-9 SCORE 8/3/2017 2/28/2018 4/4/2019   PHQ-9 Total Score - - -   PHQ-9 Total Score Jennyhart 1 (Minimal depression) - -   PHQ-9 Total Score 1 2 2     YURIY-7 SCORE 1/5/2017 2/28/2018 4/4/2019   Total Score - - -   Total Score 0 2 0           Blood pressure - has noted an increase recently.   Notes that oldest sister and brother both have afib.  Has cut down on caffeine.  Working on exercise, though knee pain is limiting.       Knee injury at work - went in for referral and bp was high  135/93, recheck - 135/95, last week - 160/101    GERD - more belching when gets home from work.  Uses zantac as needed at night          Review of Systems   Constitutional: Negative for chills.   HENT: Negative for congestion, ear pain and sore throat.    Eyes: Negative for pain and visual disturbance.   Respiratory: Negative for cough and shortness of breath.    Cardiovascular: Negative for chest pain and palpitations.   Gastrointestinal: Negative for abdominal pain, constipation, diarrhea and hematochezia.   Breasts:  Negative for tenderness, breast mass and discharge.   Genitourinary: Negative for hematuria, pelvic pain, urgency, vaginal bleeding and vaginal discharge.   Skin: Negative for rash.   Neurological: Negative for dizziness, weakness and paresthesias.        OBJECTIVE:   /86   Pulse 86   Temp 96.8  F (36  C) (Tympanic)   Ht 1.632 m (5' 4.25\")   Wt 88.5 kg (195 lb)   LMP 01/01/2010   SpO2 97%   BMI 33.21 kg/m     Wt Readings from Last 4 Encounters:   04/04/19 88.5 kg (195 lb)   02/28/18 91.6 kg (202 lb)   01/05/17 86.2 kg (190 lb)   07/11/16 89.8 kg (198 lb)       Physical Exam  GENERAL: healthy, alert and no distress  EYES: Eyes grossly normal to inspection, PERRL and conjunctivae and sclerae normal  HENT: ear canals and TM's normal, nose " and mouth without ulcers or lesions  NECK: no adenopathy, no asymmetry, masses, or scars and thyroid normal to palpation  RESP: lungs clear to auscultation - no rales, rhonchi or wheezes  BREAST: normal without masses, tenderness or nipple discharge and no palpable axillary masses or adenopathy  CV: regular rate and rhythm, normal S1 S2, no S3 or S4, no murmur, click or rub, no peripheral edema and peripheral pulses strong  ABDOMEN: soft, nontender, no hepatosplenomegaly, no masses and bowel sounds normal  MS: no gross musculoskeletal defects noted, no edema  SKIN: no suspicious lesions or rashes  NEURO: Normal strength and tone, mentation intact and speech normal  PSYCH: mentation appears normal, affect normal/bright    Diagnostic Test Results:  Future pending    ASSESSMENT/PLAN:       ICD-10-CM    1. Routine history and physical examination of adult Z00.00 Lipid panel reflex to direct LDL Fasting     Comprehensive metabolic panel     TSH with free T4 reflex   2. Gastroesophageal reflux disease, esophagitis presence not specified K21.9 Continue to use zantac as needed   3. Moderate major depression (H) F32.1 buPROPion (WELLBUTRIN XL) 300 MG 24 hr tablet  Doing well, now on lower dose of wellbutrin, previously benefited from 450mg dosing - monitor   4. Anxiety F41.9 buPROPion (WELLBUTRIN XL) 300 MG 24 hr tablet  See above   5. Need for shingles vaccine Z23      EA ADD'L VACCINE   6. Need for Tdap vaccination Z23 TDAP VACCINE (ADACEL)     ADMIN 1st VACCINE   7. Elevated blood pressure reading without diagnosis of hypertension R03.0 Lipid panel reflex to direct LDL Fasting     Comprehensive metabolic panel     TSH with free T4 reflex     Albumin Random Urine Quantitative with Creat Ratio    Labs and follow up with patient in clinic in next few weeks for recheck   8. Major depressive disorder, recurrent episode, moderate (H) F33.1 escitalopram (LEXAPRO) 20 MG tablet  Well controlled, continue current medications    "      COUNSELING:  Reviewed preventive health counseling, as reflected in patient instructions    BP Readings from Last 1 Encounters:   04/04/19 128/86     Estimated body mass index is 33.21 kg/m  as calculated from the following:    Height as of this encounter: 1.632 m (5' 4.25\").    Weight as of this encounter: 88.5 kg (195 lb).    BP Screening:   Last 3 BP Readings:    BP Readings from Last 3 Encounters:   04/04/19 128/86   02/28/18 116/80   01/05/17 110/70       The following was recommended to the patient:  Re-screen BP within a year and recommended lifestyle modifications  Weight management plan: Discussed healthy diet and exercise guidelines     reports that  has never smoked. she has never used smokeless tobacco.      Counseling Resources:  ATP IV Guidelines  Pooled Cohorts Equation Calculator  Breast Cancer Risk Calculator  FRAX Risk Assessment  ICSI Preventive Guidelines  Dietary Guidelines for Americans, 2010  USDA's MyPlate  ASA Prophylaxis  Lung CA Screening    Bernice Calderon MD  [facility]  "

## 2019-04-04 NOTE — PATIENT INSTRUCTIONS
SPRINT trial - look it up and see what you think    I look forward to seeing you in 2 weeks    Tdap and Shingrix today    Medication refills sent to pharmacy                    Preventive Health Recommendations  Female Ages 50 - 64    Yearly exam: See your health care provider every year in order to  o Review health changes.   o Discuss preventive care.    o Review your medicines if your doctor has prescribed any.      Get a Pap test every three years (unless you have an abnormal result and your provider advises testing more often).    If you get Pap tests with HPV test, you only need to test every 5 years, unless you have an abnormal result.     You do not need a Pap test if your uterus was removed (hysterectomy) and you have not had cancer.    You should be tested each year for STDs (sexually transmitted diseases) if you're at risk.     Have a mammogram every 1 to 2 years.    Have a colonoscopy at age 50, or have a yearly FIT test (stool test). These exams screen for colon cancer.      Have a cholesterol test every 5 years, or more often if advised.    Have a diabetes test (fasting glucose) every three years. If you are at risk for diabetes, you should have this test more often.     If you are at risk for osteoporosis (brittle bone disease), think about having a bone density scan (DEXA).    Shots: Get a flu shot each year. Get a tetanus shot every 10 years.    Nutrition:     Eat at least 5 servings of fruits and vegetables each day.    Eat whole-grain bread, whole-wheat pasta and brown rice instead of white grains and rice.    Get adequate Calcium and Vitamin D.     Lifestyle    Exercise at least 150 minutes a week (30 minutes a day, 5 days a week). This will help you control your weight and prevent disease.    Limit alcohol to one drink per day.    No smoking.     Wear sunscreen to prevent skin cancer.     See your dentist every six months for an exam and cleaning.    See your eye doctor every 1 to 2 years.

## 2019-04-05 ASSESSMENT — ANXIETY QUESTIONNAIRES: GAD7 TOTAL SCORE: 0

## 2019-04-05 NOTE — TELEPHONE ENCOUNTER
Last colonoscopy 6/1/2009.  Patient will be due this June.  Last physical 4/4/19.  Order for colonoscopy screening placed with cosign.  Patient advised about due date.   Libertad Hansen RN  Message handled by Nurse Triage.

## 2019-04-16 DIAGNOSIS — Z00.00 ROUTINE HISTORY AND PHYSICAL EXAMINATION OF ADULT: ICD-10-CM

## 2019-04-16 DIAGNOSIS — R03.0 ELEVATED BLOOD PRESSURE READING WITHOUT DIAGNOSIS OF HYPERTENSION: ICD-10-CM

## 2019-04-16 LAB
ALBUMIN SERPL-MCNC: 3.5 G/DL (ref 3.4–5)
ALP SERPL-CCNC: 87 U/L (ref 40–150)
ALT SERPL W P-5'-P-CCNC: 24 U/L (ref 0–50)
ANION GAP SERPL CALCULATED.3IONS-SCNC: 8 MMOL/L (ref 3–14)
AST SERPL W P-5'-P-CCNC: 23 U/L (ref 0–45)
BILIRUB SERPL-MCNC: 0.6 MG/DL (ref 0.2–1.3)
BUN SERPL-MCNC: 15 MG/DL (ref 7–30)
CALCIUM SERPL-MCNC: 9 MG/DL (ref 8.5–10.1)
CHLORIDE SERPL-SCNC: 109 MMOL/L (ref 94–109)
CHOLEST SERPL-MCNC: 194 MG/DL
CO2 SERPL-SCNC: 25 MMOL/L (ref 20–32)
CREAT SERPL-MCNC: 0.99 MG/DL (ref 0.52–1.04)
CREAT UR-MCNC: 183 MG/DL
GFR SERPL CREATININE-BSD FRML MDRD: 62 ML/MIN/{1.73_M2}
GLUCOSE SERPL-MCNC: 87 MG/DL (ref 70–99)
HDLC SERPL-MCNC: 45 MG/DL
LDLC SERPL CALC-MCNC: 134 MG/DL
MICROALBUMIN UR-MCNC: 12 MG/L
MICROALBUMIN/CREAT UR: 6.61 MG/G CR (ref 0–25)
NONHDLC SERPL-MCNC: 149 MG/DL
POTASSIUM SERPL-SCNC: 3.9 MMOL/L (ref 3.4–5.3)
PROT SERPL-MCNC: 7.2 G/DL (ref 6.8–8.8)
SODIUM SERPL-SCNC: 142 MMOL/L (ref 133–144)
TRIGL SERPL-MCNC: 73 MG/DL
TSH SERPL DL<=0.005 MIU/L-ACNC: 2.42 MU/L (ref 0.4–4)

## 2019-04-16 PROCEDURE — 82043 UR ALBUMIN QUANTITATIVE: CPT | Performed by: PEDIATRICS

## 2019-04-16 PROCEDURE — 84443 ASSAY THYROID STIM HORMONE: CPT | Performed by: PEDIATRICS

## 2019-04-16 PROCEDURE — 36415 COLL VENOUS BLD VENIPUNCTURE: CPT | Performed by: PEDIATRICS

## 2019-04-16 PROCEDURE — 80061 LIPID PANEL: CPT | Performed by: PEDIATRICS

## 2019-04-16 PROCEDURE — 80053 COMPREHEN METABOLIC PANEL: CPT | Performed by: PEDIATRICS

## 2019-04-17 ENCOUNTER — OFFICE VISIT (OUTPATIENT)
Dept: PEDIATRICS | Facility: CLINIC | Age: 59
End: 2019-04-17
Payer: COMMERCIAL

## 2019-04-17 VITALS
OXYGEN SATURATION: 98 % | HEIGHT: 64 IN | BODY MASS INDEX: 33.24 KG/M2 | TEMPERATURE: 97.1 F | SYSTOLIC BLOOD PRESSURE: 138 MMHG | HEART RATE: 85 BPM | WEIGHT: 194.7 LBS | DIASTOLIC BLOOD PRESSURE: 82 MMHG

## 2019-04-17 DIAGNOSIS — I10 ESSENTIAL HYPERTENSION: Primary | ICD-10-CM

## 2019-04-17 DIAGNOSIS — F32.1 MODERATE MAJOR DEPRESSION (H): ICD-10-CM

## 2019-04-17 PROCEDURE — 99213 OFFICE O/P EST LOW 20 MIN: CPT | Performed by: PEDIATRICS

## 2019-04-17 RX ORDER — AMLODIPINE BESYLATE 5 MG/1
5 TABLET ORAL DAILY
Qty: 30 TABLET | Refills: 3 | Status: SHIPPED | OUTPATIENT
Start: 2019-04-17 | End: 2019-05-01

## 2019-04-17 ASSESSMENT — MIFFLIN-ST. JEOR: SCORE: 1442.12

## 2019-04-17 NOTE — PATIENT INSTRUCTIONS
Start amlodipine    Send me blood pressure updates via [employer] in about 3 weeks, contact me sooner with problems    Once bp lower, we can plan to restart the additional 150mg of wellbutrin daily.

## 2019-04-17 NOTE — PROGRESS NOTES
"  SUBJECTIVE:   Sanjuanita Saul is a 58 year old female who presents to clinic today for the following   health issues:      Pt is here for a Blood pressure follow up. Pt is not taking BP at home denies any sx    Patient is here to follow-up blood pressure.  We discussed this at her recent physical.  There have been some concern that her high dose of Wellbutrin was contributing to her high blood pressure.  She had decreased her dose of Wellbutrin from 450 mg daily down to 300 mg daily.  Her blood pressure has remained elevated in the 130s-140s over 80s.  She denies any symptoms related to high blood pressure, with no new cardiac or neurologic symptoms.  She is open to starting a medication today.        Additional history: as documented    Reviewed  and updated as needed this visit by clinical staff  Tobacco  Allergies  Meds  Med Hx  Surg Hx  Fam Hx  Soc Hx        Reviewed and updated as needed this visit by Provider             ROS:  Constitutional, cv, pulm, psych systems are negative, except as otherwise noted.    OBJECTIVE:     /82 (BP Location: Right arm, Patient Position: Chair, Cuff Size: Adult Regular)   Pulse 85   Temp 97.1  F (36.2  C) (Tympanic)   Ht 1.632 m (5' 4.25\")   Wt 88.3 kg (194 lb 11.2 oz)   LMP 01/01/2010   SpO2 98%   BMI 33.16 kg/m    Body mass index is 33.16 kg/m .  GENERAL: healthy, alert and no distress  PSYCH: mentation appears normal, affect normal/bright    Diagnostic Test Results:  Reviewed from recent physical -normal kidney function and electrolytes, negative urine microalbumin    ASSESSMENT/PLAN:         ICD-10-CM    1. Essential hypertension I10 amLODIPine (NORVASC) 5 MG tablet    Discussed treatment options in clinic today.  Plan together to start low-dose of Norvasc.  Medication risk benefits and side effects reviewed.  Patient has access to RNs who can check her blood pressure work.  She will continue to follow her blood pressure work after starting medication and " send me readings in 3 weeks time.  We will adjust further based on this information.   2. Moderate major depression (H) F32.1  we recently decreased patient's Wellbutrin dosing due to concerns about it contributing to higher blood pressures.  Once blood pressures are under good control, we will plan to increase her Wellbutrin dosing back to 450 mg daily.  Adjustment pending good control of blood pressure.       See Patient Instructions    Bernice Calderon MD  [facility]

## 2019-04-30 ENCOUNTER — MYC MEDICAL ADVICE (OUTPATIENT)
Dept: PEDIATRICS | Facility: CLINIC | Age: 59
End: 2019-04-30

## 2019-04-30 DIAGNOSIS — I10 ESSENTIAL HYPERTENSION: ICD-10-CM

## 2019-05-01 RX ORDER — AMLODIPINE BESYLATE 10 MG/1
10 TABLET ORAL DAILY
Qty: 30 TABLET | Refills: 1 | Status: SHIPPED | OUTPATIENT
Start: 2019-05-01 | End: 2019-07-05

## 2019-05-01 NOTE — TELEPHONE ENCOUNTER
Started Amlodipine 4/19/219.  Reports home readings are 140/92 yesterday.  Reports having some stomach aches/cramping a few times.      BP Readings from Last 3 Encounters:   04/17/19 138/82   04/04/19 128/86   02/28/18 116/80          LOV notes: 4/17/2019  1. Essential hypertension I10 amLODIPine (NORVASC) 5 MG tablet     Discussed treatment options in clinic today.  Plan together to start low-dose of Norvasc.  Medication risk benefits and side effects reviewed.  Patient has access to RNs who can check her blood pressure work.  She will continue to follow her blood pressure work after starting medication and send me readings in 3 weeks time.  We will adjust further based on this information.       Message handled by Nurse Triage with Tory - provider name: Dr. Calderon.  Ok to increase amlodipine to 10 mg daily and continue to monitor and update with readings in 3 weeks.  Stomach symptoms noted not common with amlodipine.    Medication ordered, patient sent message with provider recommendations.  Mikki LANCE RN - Triage  [facility]

## 2019-05-24 ENCOUNTER — MYC MEDICAL ADVICE (OUTPATIENT)
Dept: PEDIATRICS | Facility: CLINIC | Age: 59
End: 2019-05-24

## 2019-05-24 NOTE — TELEPHONE ENCOUNTER
Pt sent a [employer] message update on her BP readings.  On 4/30/19, amlodipine was increased to 10 mg daily.  Pt's BP today was 127/87.    Please advise-    Marly Burt RN - Triage  [facility]

## 2019-06-06 ENCOUNTER — MYC MEDICAL ADVICE (OUTPATIENT)
Dept: PEDIATRICS | Facility: CLINIC | Age: 59
End: 2019-06-06

## 2019-06-06 DIAGNOSIS — Z12.11 COLON CANCER SCREENING: Primary | ICD-10-CM

## 2019-06-06 NOTE — TELEPHONE ENCOUNTER
Called patient and LVM. Faxed referral to [facility] for [facility] ([phone]).    Hollie Scott CMA on 4/23/2019 at 9:32 AM

## 2019-06-06 NOTE — TELEPHONE ENCOUNTER
New order for colonoscopy at [facility] placed per patient request.    Can we fax this for patient?  I'm not sure where to send it.    Please alert Sanjuanita when completed.    Thanks!!      Bernice Calderon MD  Internal Medicine - Pediatrics

## 2019-07-03 ENCOUNTER — MYC MEDICAL ADVICE (OUTPATIENT)
Dept: PEDIATRICS | Facility: CLINIC | Age: 59
End: 2019-07-03

## 2019-07-05 ENCOUNTER — MYC REFILL (OUTPATIENT)
Dept: PEDIATRICS | Facility: CLINIC | Age: 59
End: 2019-07-05

## 2019-07-05 DIAGNOSIS — I10 ESSENTIAL HYPERTENSION: ICD-10-CM

## 2019-07-05 RX ORDER — AMLODIPINE BESYLATE 10 MG/1
10 TABLET ORAL DAILY
Qty: 30 TABLET | Refills: 1 | Status: SHIPPED | OUTPATIENT
Start: 2019-07-05 | End: 2019-09-05

## 2019-07-05 NOTE — TELEPHONE ENCOUNTER
"Requested Prescriptions   Pending Prescriptions Disp Refills     amLODIPine (NORVASC) 10 MG tablet  Last Written Prescription Date:  05/01/2019  Last Fill Quantity: 30 tablet,  # refills: 1   Last Office Visit: 4/17/2019 Bernice Calderon MD   Future Office Visit:      30 tablet 1     Sig: Take 1 tablet (10 mg) by mouth daily       Calcium Channel Blockers Protocol  Passed - 7/5/2019 10:35 AM        Passed - Blood pressure under 140/90 in past 12 months     BP Readings from Last 3 Encounters:   04/17/19 138/82   04/04/19 128/86   02/28/18 116/80                 Passed - Recent (12 mo) or future (30 days) visit within the authorizing provider's specialty     Patient had office visit in the last 12 months or has a visit in the next 30 days with authorizing provider or within the authorizing provider's specialty.  See \"Patient Info\" tab in inbasket, or \"Choose Columns\" in Meds & Orders section of the refill encounter.              Passed - Medication is active on med list        Passed - Patient is age 18 or older        Passed - No active pregnancy on record        Passed - Normal serum creatinine on file in past 12 months     Recent Labs   Lab Test 04/16/19  0721   CR 0.99             Passed - No positive pregnancy test in past 12 months      .  "

## 2019-07-05 NOTE — TELEPHONE ENCOUNTER
Prescription approved per [facility] Refill Protocol.  Melyssa Stephens RN

## 2019-09-05 ENCOUNTER — MYC MEDICAL ADVICE (OUTPATIENT)
Dept: PEDIATRICS | Facility: CLINIC | Age: 59
End: 2019-09-05

## 2019-09-05 ENCOUNTER — MYC REFILL (OUTPATIENT)
Dept: PEDIATRICS | Facility: CLINIC | Age: 59
End: 2019-09-05

## 2019-09-05 DIAGNOSIS — I10 ESSENTIAL HYPERTENSION: ICD-10-CM

## 2019-09-06 RX ORDER — AMLODIPINE BESYLATE 10 MG/1
10 TABLET ORAL DAILY
Qty: 90 TABLET | Refills: 1 | Status: SHIPPED | OUTPATIENT
Start: 2019-09-06 | End: 2020-02-27

## 2019-09-13 ENCOUNTER — ANCILLARY PROCEDURE (OUTPATIENT)
Dept: MAMMOGRAPHY | Facility: CLINIC | Age: 59
End: 2019-09-13
Payer: COMMERCIAL

## 2019-09-13 DIAGNOSIS — Z12.31 VISIT FOR SCREENING MAMMOGRAM: ICD-10-CM

## 2019-09-13 PROCEDURE — 77067 SCR MAMMO BI INCL CAD: CPT | Mod: TC

## 2019-10-03 ENCOUNTER — HEALTH MAINTENANCE LETTER (OUTPATIENT)
Age: 59
End: 2019-10-03

## 2020-02-27 ENCOUNTER — MYC REFILL (OUTPATIENT)
Dept: PEDIATRICS | Facility: CLINIC | Age: 60
End: 2020-02-27

## 2020-02-27 DIAGNOSIS — I10 ESSENTIAL HYPERTENSION: ICD-10-CM

## 2020-02-28 RX ORDER — AMLODIPINE BESYLATE 10 MG/1
10 TABLET ORAL DAILY
Qty: 90 TABLET | Refills: 0 | Status: SHIPPED | OUTPATIENT
Start: 2020-02-28 | End: 2020-05-27

## 2020-02-28 NOTE — TELEPHONE ENCOUNTER
"Prescription approved per [facility], [facility] or [facility] refill protocol.  Mikki KUO - Registered Nurse  [facility]  Acute and Diagnostic Services          Requested Prescriptions   Pending Prescriptions Disp Refills     amLODIPine (NORVASC) 10 MG tablet 90 tablet 1     Sig: Take 1 tablet (10 mg) by mouth daily       Calcium Channel Blockers Protocol  Passed - 2/27/2020  2:36 PM        Passed - Blood pressure under 140/90 in past 12 months     BP Readings from Last 3 Encounters:   04/17/19 138/82   04/04/19 128/86   02/28/18 116/80                 Passed - Recent (12 mo) or future (30 days) visit within the authorizing provider's specialty     Patient has had an office visit with the authorizing provider or a provider within the authorizing providers department within the previous 12 mos or has a future within next 30 days. See \"Patient Info\" tab in inbasket, or \"Choose Columns\" in Meds & Orders section of the refill encounter.              Passed - Medication is active on med list        Passed - Patient is age 18 or older        Passed - No active pregnancy on record        Passed - Normal serum creatinine on file in past 12 months     Recent Labs   Lab Test 04/16/19  0721   CR 0.99             Passed - No positive pregnancy test in past 12 months          "

## 2020-05-11 ENCOUNTER — TELEPHONE (OUTPATIENT)
Dept: PEDIATRICS | Facility: CLINIC | Age: 60
End: 2020-05-11

## 2020-05-11 NOTE — TELEPHONE ENCOUNTER
1st attempt:  Patient's appointment 05/18/20 needs to be rescheduled to a virtual visit on a different day (provider is furloughed this week).  Called patient and requested a call back to reschedule appointment.  Upon return call, patient can schedule a virtual visit.  Patient can also schedule a RN only visit for a shingles shot, if patient would still like to have that done.    Dorie Thakur

## 2020-05-12 DIAGNOSIS — F41.9 ANXIETY: ICD-10-CM

## 2020-05-12 DIAGNOSIS — F32.1 MODERATE MAJOR DEPRESSION (H): ICD-10-CM

## 2020-05-12 DIAGNOSIS — F33.1 MAJOR DEPRESSIVE DISORDER, RECURRENT EPISODE, MODERATE (H): ICD-10-CM

## 2020-05-12 NOTE — TELEPHONE ENCOUNTER
Patient had to reschedule her appointment due to PCP being furloughed. Patient is now scheduled for 27 May 2020. Patient does not have enough medications to ast till her appointment.     Ruma Aguilar, EMT at 2:38 PM on May 12, 2020  [facility] Health Guide   [phone]

## 2020-05-13 RX ORDER — ESCITALOPRAM OXALATE 20 MG/1
20 TABLET ORAL DAILY
Qty: 30 TABLET | Refills: 0 | Status: SHIPPED | OUTPATIENT
Start: 2020-05-13 | End: 2020-05-27

## 2020-05-13 RX ORDER — BUPROPION HYDROCHLORIDE 300 MG/1
300 TABLET ORAL EVERY MORNING
Qty: 30 TABLET | Refills: 0 | Status: SHIPPED | OUTPATIENT
Start: 2020-05-13 | End: 2020-05-27

## 2020-05-27 ENCOUNTER — VIRTUAL VISIT (OUTPATIENT)
Dept: PEDIATRICS | Facility: CLINIC | Age: 60
End: 2020-05-27
Payer: COMMERCIAL

## 2020-05-27 DIAGNOSIS — I10 ESSENTIAL HYPERTENSION: ICD-10-CM

## 2020-05-27 DIAGNOSIS — F41.9 ANXIETY: ICD-10-CM

## 2020-05-27 DIAGNOSIS — H02.409 PTOSIS OF EYELID, UNSPECIFIED LATERALITY: Primary | ICD-10-CM

## 2020-05-27 DIAGNOSIS — F33.1 MAJOR DEPRESSIVE DISORDER, RECURRENT EPISODE, MODERATE (H): ICD-10-CM

## 2020-05-27 PROCEDURE — 99214 OFFICE O/P EST MOD 30 MIN: CPT | Mod: TEL | Performed by: PEDIATRICS

## 2020-05-27 RX ORDER — AMLODIPINE BESYLATE 10 MG/1
10 TABLET ORAL DAILY
Qty: 90 TABLET | Refills: 1 | Status: SHIPPED | OUTPATIENT
Start: 2020-05-27 | End: 2020-12-03

## 2020-05-27 RX ORDER — ESCITALOPRAM OXALATE 20 MG/1
20 TABLET ORAL DAILY
Qty: 90 TABLET | Refills: 1 | Status: SHIPPED | OUTPATIENT
Start: 2020-05-27 | End: 2021-01-11

## 2020-05-27 RX ORDER — BUPROPION HYDROCHLORIDE 300 MG/1
300 TABLET ORAL EVERY MORNING
Qty: 90 TABLET | Refills: 1 | Status: SHIPPED | OUTPATIENT
Start: 2020-05-27 | End: 2021-01-11

## 2020-05-27 NOTE — PROGRESS NOTES
"Sanjuanita Saul is a 59 year old female who is being evaluated via a billable telephone visit.      The patient has been notified of following:     \"This telephone visit will be conducted via a call between you and your physician/provider. We have found that certain health care needs can be provided without the need for a physical exam.  This service lets us provide the care you need with a short phone conversation.  If a prescription is necessary we can send it directly to your pharmacy.  If lab work is needed we can place an order for that and you can then stop by our lab to have the test done at a later time.    Telephone visits are billed at different rates depending on your insurance coverage. During this emergency period, for some insurers they may be billed the same as an in-person visit.  Please reach out to your insurance provider with any questions.    If during the course of the call the physician/provider feels a telephone visit is not appropriate, you will not be charged for this service.\"    Patient has given verbal consent for Telephone visit?  Yes    What phone number would you like to be contacted at? [phone]    How would you like to obtain your AVS? MyChart    Subjective     Sanjuanita Saul is a 59 year old female who presents via phone visit today for the following health issues:    HPI     Patient following up multiple issues today:    Depression and anxiety have been under reasonable control on current dosing of Wellbutrin and Lexapro without side effect.  Patient prefers to continue her current dosing schedule.    Hypertension: Has been controlled on current dose of amlodipine without new symptoms or side effects.  Blood pressures at home measured in the 110s over 70s.    Patient recently visited with optometry and was noted to have significant ptosis the cut into her visual fields, recommending an ophthalmology visit for surgical correction.  Patient's ophthalmologist requires a referral from " primary care provider.  Patient works at [employer] and wants to see a surgeon at [facility] and needs a new referral.    Patient is due for labs and shingles vaccine.  We are hopeful that we will be able to do a physical and address all of these things later this fall.            Reviewed and updated as needed this visit by Provider                 Assessment/Plan:  1. Major depressive disorder, recurrent episode, moderate (H)  Well-controlled, continue current medication  - escitalopram (LEXAPRO) 20 MG tablet; Take 1 tablet (20 mg) by mouth daily  Dispense: 90 tablet; Refill: 1    2. Anxiety  Well-controlled, continue current medication  - buPROPion (WELLBUTRIN XL) 300 MG 24 hr tablet; Take 1 tablet (300 mg) by mouth every morning  Dispense: 90 tablet; Refill: 1    3. Essential hypertension  Well-controlled, continue current medication  - amLODIPine (NORVASC) 10 MG tablet; Take 1 tablet (10 mg) by mouth daily  Dispense: 90 tablet; Refill: 1    4. Ptosis of eyelid, unspecified laterality  Needs to have this done at [facility] where she works new referral placed  - OPHTHALMOLOGY ADULT REFERRAL    Due for physical this fall - will plan for labs, shingrix, bp check at that time    Phone call duration:  9 minutes    Bernice Calderon MD

## 2020-11-07 ENCOUNTER — HEALTH MAINTENANCE LETTER (OUTPATIENT)
Age: 60
End: 2020-11-07

## 2020-12-03 ENCOUNTER — MYC REFILL (OUTPATIENT)
Dept: PEDIATRICS | Facility: CLINIC | Age: 60
End: 2020-12-03

## 2020-12-03 DIAGNOSIS — I10 ESSENTIAL HYPERTENSION: ICD-10-CM

## 2020-12-04 RX ORDER — AMLODIPINE BESYLATE 10 MG/1
10 TABLET ORAL DAILY
Qty: 90 TABLET | Refills: 0 | Status: SHIPPED | OUTPATIENT
Start: 2020-12-04 | End: 2021-02-24

## 2020-12-04 NOTE — TELEPHONE ENCOUNTER
Routing refill request to provider for review/approval because:  BP not current.     Visit is up to date. Do you want the Pt to come in for a BP nurse only check, or full OV?    RN will issue 90 day alma rosa refill.     Keyona Connell, RN   [facility] -- Triage Nurse

## 2020-12-04 NOTE — TELEPHONE ENCOUNTER
Patient has an appointment scheduled with me in 2 months.  That is perfect.      Bernice Calderon MD  Internal Medicine - Pediatrics

## 2020-12-07 DIAGNOSIS — Z12.31 VISIT FOR SCREENING MAMMOGRAM: ICD-10-CM

## 2021-02-21 ASSESSMENT — ENCOUNTER SYMPTOMS
NERVOUS/ANXIOUS: 0
FEVER: 0
HEMATURIA: 0
BREAST MASS: 0
NAUSEA: 0
CHILLS: 0
MYALGIAS: 0
COUGH: 0
DIZZINESS: 0
ABDOMINAL PAIN: 0
JOINT SWELLING: 0
ARTHRALGIAS: 0
SHORTNESS OF BREATH: 0
DIARRHEA: 0
HEARTBURN: 1
WEAKNESS: 0
HEMATOCHEZIA: 0
HEADACHES: 0
PARESTHESIAS: 0
SORE THROAT: 0
CONSTIPATION: 0
FREQUENCY: 0
EYE PAIN: 0
DYSURIA: 0
PALPITATIONS: 0

## 2021-02-21 ASSESSMENT — PATIENT HEALTH QUESTIONNAIRE - PHQ9
SUM OF ALL RESPONSES TO PHQ QUESTIONS 1-9: 6
10. IF YOU CHECKED OFF ANY PROBLEMS, HOW DIFFICULT HAVE THESE PROBLEMS MADE IT FOR YOU TO DO YOUR WORK, TAKE CARE OF THINGS AT HOME, OR GET ALONG WITH OTHER PEOPLE: SOMEWHAT DIFFICULT
SUM OF ALL RESPONSES TO PHQ QUESTIONS 1-9: 6

## 2021-02-21 ASSESSMENT — ANXIETY QUESTIONNAIRES
GAD7 TOTAL SCORE: 0
7. FEELING AFRAID AS IF SOMETHING AWFUL MIGHT HAPPEN: NOT AT ALL
GAD7 TOTAL SCORE: 0
5. BEING SO RESTLESS THAT IT IS HARD TO SIT STILL: NOT AT ALL
6. BECOMING EASILY ANNOYED OR IRRITABLE: NOT AT ALL
GAD7 TOTAL SCORE: 0
1. FEELING NERVOUS, ANXIOUS, OR ON EDGE: NOT AT ALL
7. FEELING AFRAID AS IF SOMETHING AWFUL MIGHT HAPPEN: NOT AT ALL
3. WORRYING TOO MUCH ABOUT DIFFERENT THINGS: NOT AT ALL
4. TROUBLE RELAXING: NOT AT ALL
2. NOT BEING ABLE TO STOP OR CONTROL WORRYING: NOT AT ALL

## 2021-02-22 ASSESSMENT — ANXIETY QUESTIONNAIRES: GAD7 TOTAL SCORE: 0

## 2021-02-22 ASSESSMENT — PATIENT HEALTH QUESTIONNAIRE - PHQ9: SUM OF ALL RESPONSES TO PHQ QUESTIONS 1-9: 6

## 2021-02-24 ENCOUNTER — OFFICE VISIT (OUTPATIENT)
Dept: PEDIATRICS | Facility: CLINIC | Age: 61
End: 2021-02-24
Payer: COMMERCIAL

## 2021-02-24 VITALS
WEIGHT: 188 LBS | SYSTOLIC BLOOD PRESSURE: 104 MMHG | OXYGEN SATURATION: 96 % | HEART RATE: 95 BPM | DIASTOLIC BLOOD PRESSURE: 60 MMHG | TEMPERATURE: 97.6 F | RESPIRATION RATE: 16 BRPM | BODY MASS INDEX: 32.1 KG/M2 | HEIGHT: 64 IN

## 2021-02-24 DIAGNOSIS — D22.9 MULTIPLE PIGMENTED NEVI: ICD-10-CM

## 2021-02-24 DIAGNOSIS — F41.9 ANXIETY: ICD-10-CM

## 2021-02-24 DIAGNOSIS — Z23 NEED FOR SHINGLES VACCINE: ICD-10-CM

## 2021-02-24 DIAGNOSIS — I10 ESSENTIAL HYPERTENSION: ICD-10-CM

## 2021-02-24 DIAGNOSIS — Z00.00 ROUTINE GENERAL MEDICAL EXAMINATION AT A HEALTH CARE FACILITY: Primary | ICD-10-CM

## 2021-02-24 DIAGNOSIS — F32.1 MODERATE MAJOR DEPRESSION (H): ICD-10-CM

## 2021-02-24 PROCEDURE — 90750 HZV VACC RECOMBINANT IM: CPT | Performed by: PEDIATRICS

## 2021-02-24 PROCEDURE — 90471 IMMUNIZATION ADMIN: CPT | Performed by: PEDIATRICS

## 2021-02-24 PROCEDURE — 99214 OFFICE O/P EST MOD 30 MIN: CPT | Mod: 25 | Performed by: PEDIATRICS

## 2021-02-24 PROCEDURE — 99396 PREV VISIT EST AGE 40-64: CPT | Mod: 25 | Performed by: PEDIATRICS

## 2021-02-24 RX ORDER — BUPROPION HYDROCHLORIDE 150 MG/1
150 TABLET ORAL EVERY MORNING
Qty: 90 TABLET | Refills: 3 | Status: SHIPPED | OUTPATIENT
Start: 2021-02-24 | End: 2022-04-27

## 2021-02-24 RX ORDER — AMLODIPINE BESYLATE 10 MG/1
10 TABLET ORAL DAILY
Qty: 90 TABLET | Refills: 3 | Status: SHIPPED | OUTPATIENT
Start: 2021-02-24 | End: 2022-03-09

## 2021-02-24 ASSESSMENT — ENCOUNTER SYMPTOMS
PALPITATIONS: 0
EYE PAIN: 0
HEADACHES: 0
FEVER: 0
FREQUENCY: 0
WEAKNESS: 0
SHORTNESS OF BREATH: 0
PARESTHESIAS: 0
DYSURIA: 0
NAUSEA: 0
MYALGIAS: 0
HEARTBURN: 1
ARTHRALGIAS: 0
DIARRHEA: 0
ABDOMINAL PAIN: 0
SORE THROAT: 0
DIZZINESS: 0
HEMATOCHEZIA: 0
CHILLS: 0
NERVOUS/ANXIOUS: 0
CONSTIPATION: 0
COUGH: 0
JOINT SWELLING: 0
HEMATURIA: 0
BREAST MASS: 0

## 2021-02-24 ASSESSMENT — MIFFLIN-ST. JEOR: SCORE: 1401.73

## 2021-02-24 NOTE — PROGRESS NOTES
SUBJECTIVE:   CC: Sanjuanita Saul is an 60 year old woman who presents for preventive health visit.       Patient has been advised of split billing requirements and indicates understanding: Yes  Healthy Habits:     Getting at least 3 servings of Calcium per day:  Yes    Bi-annual eye exam:  NO    Dental care twice a year:  Yes    Sleep apnea or symptoms of sleep apnea:  None    Diet:  Regular (no restrictions)    Frequency of exercise:  None    Taking medications regularly:  Yes    Medication side effects:  None    PHQ-2 Total Score: 1            Today's PHQ-2 Score:   PHQ-2 ( 1999 Pfizer) 2/21/2021   Q1: Little interest or pleasure in doing things 1   Q2: Feeling down, depressed or hopeless 0   PHQ-2 Score 1   Q1: Little interest or pleasure in doing things Several days   Q2: Feeling down, depressed or hopeless Not at all   PHQ-2 Score 1       Abuse: Current or Past (Physical, Sexual or Emotional) - No  Do you feel safe in your environment? Yes        Social History     Tobacco Use     Smoking status: Never Smoker     Smokeless tobacco: Never Used   Substance Use Topics     Alcohol use: No         Alcohol Use 2/21/2021   Prescreen: >3 drinks/day or >7 drinks/week? Not Applicable   Prescreen: >3 drinks/day or >7 drinks/week? -       Any new diagnosis of family breast, ovarian, or bowel cancer? No     Reviewed orders with patient.  Reviewed health maintenance and updated orders accordingly - Yes      Breast CA Risk Screening:    Mammogram Screening: Recommended mammography every 1-2 years with patient discussion and risk factor consideration  Pertinent mammograms are reviewed under the imaging tab.    History of abnormal Pap smear: NO - age 30-65 PAP every 5 years with negative HPV co-testing recommended     Reviewed and updated as needed this visit by clinical staff  Tobacco  Allergies    Med Hx  Surg Hx  Fam Hx  Soc Hx        Reviewed and updated as needed this visit by Provider                  June -  [place] to [place] - riding 4-5 hours per day    HTN - has been well controlled, no new cardiac symptoms.  Tolerating amlodipine well    Dep/anxiety - overall, doing really well with the stressors of the pandemic.   More fatigued than usual and missing motivation - wondering about going back up to past wellbutrin dose to assist with this now that bp is well controlled    PHQ 4/4/2019 5/27/2020 2/21/2021   PHQ-9 Total Score 2 10 6   Q9: Thoughts of better off dead/self-harm past 2 weeks Not at all Not at all Not at all     YURIY-7 SCORE 4/4/2019 5/27/2020 2/21/2021   Total Score - - -   Total Score - 0 (minimal anxiety) 0 (minimal anxiety)   Total Score 0 0 0     Working on increased activity and toning - has been taking the stairs a lot.   Weight down and clothing fitting better    Has a new spot on nose that is scaling and not healing over time - right where mask rubs.  High risk skin type. Last derm eval at [facility] 2 years ago      Review of Systems   Constitutional: Negative for chills and fever.   HENT: Negative for congestion, ear pain, hearing loss and sore throat.    Eyes: Negative for pain and visual disturbance.   Respiratory: Negative for cough and shortness of breath.    Cardiovascular: Negative for chest pain, palpitations and peripheral edema.   Gastrointestinal: Positive for heartburn. Negative for abdominal pain, constipation, diarrhea, hematochezia and nausea.   Breasts:  Negative for tenderness, breast mass and discharge.   Genitourinary: Negative for dysuria, frequency, genital sores, hematuria, pelvic pain, urgency, vaginal bleeding and vaginal discharge.   Musculoskeletal: Negative for arthralgias, joint swelling and myalgias.   Skin: Positive for rash.   Neurological: Negative for dizziness, weakness, headaches and paresthesias.   Psychiatric/Behavioral: Negative for mood changes. The patient is not nervous/anxious.    Answers for HPI/ROS submitted by the patient on  "2/21/2021   Annual Exam:  If you checked off any problems, how difficult have these problems made it for you to do your work, take care of things at home, or get along with other people?: Somewhat difficult  PHQ9 TOTAL SCORE: 6  YURIY 7 TOTAL SCORE: 0       OBJECTIVE:   /60 (Cuff Size: Adult Large)   Pulse 95   Temp 97.6  F (36.4  C) (Tympanic)   Resp 16   Ht 1.632 m (5' 4.25\")   Wt 85.3 kg (188 lb)   LMP 01/01/2010   SpO2 96%   BMI 32.02 kg/m    Physical Exam  GENERAL: healthy, alert and no distress  EYES: Eyes grossly normal to inspection, PERRL and conjunctivae and sclerae normal  HENT: ear canals and TM's normal, nose and mouth without ulcers or lesions  NECK: no adenopathy, no asymmetry, masses, or scars and thyroid normal to palpation  RESP: lungs clear to auscultation - no rales, rhonchi or wheezes  CV: regular rate and rhythm, normal S1 S2, no S3 or S4, no murmur, click or rub, no peripheral edema and peripheral pulses strong  ABDOMEN: soft, nontender, no hepatosplenomegaly, no masses and bowel sounds normal  MS: no gross musculoskeletal defects noted, no edema  SKIN: 3mm scaling, erythematous patch bridge of nose on the right side, pale skin, scattered freckling, moles  NEURO: Normal strength and tone, mentation intact and speech normal  PSYCH: mentation appears normal, affect normal/bright    Diagnostic Test Results:  pending    ASSESSMENT/PLAN:       ICD-10-CM    1. Routine general medical examination at a health care facility  Z00.00 Lipid panel reflex to direct LDL Fasting     Comprehensive metabolic panel     TSH with free T4 reflex   2. Moderate major depression (H)  F32.1 buPROPion (WELLBUTRIN XL) 150 MG 24 hr tablet    Continue lexapro at present dosing, increased wellbutrin to 450mg and patient to update me with how this is going in 3-4 weeks     3. Essential hypertension  I10 Lipid panel reflex to direct LDL Fasting     Comprehensive metabolic panel     TSH with free T4 reflex     " "amLODIPine (NORVASC) 10 MG tablet     Albumin Random Urine Quantitative with Creat Ratio    Well controlled, continue current medications  Patient to send me updated bps after increasing wellbutrin dosing   4. Anxiety  F41.9 Continue lexapro at current dose   5. Need for shingles vaccine  Z23 ZOSTER VACCINE RECOMBINANT ADJUVANTED IM NJX   6. Multiple pigmented nevi  D22.9 DERMATOLOGY ADULT REFERRAL  Recommend full skin exam - spot on nose may be related to irritation from face mask, but in high risk area for skin cancer, so requested evaluation       COUNSELING:  Special attention given to:        Regular exercise       Healthy diet/nutrition       Vision screening       Immunizations    Vaccinated for: Zoster          Estimated body mass index is 32.02 kg/m  as calculated from the following:    Height as of this encounter: 1.632 m (5' 4.25\").    Weight as of this encounter: 85.3 kg (188 lb).    Weight management plan: Discussed healthy diet and exercise guidelines    She reports that she has never smoked. She has never used smokeless tobacco.      Counseling Resources:  ATP IV Guidelines  Pooled Cohorts Equation Calculator  Breast Cancer Risk Calculator  BRCA-Related Cancer Risk Assessment: FHS-7 Tool  FRAX Risk Assessment  ICSI Preventive Guidelines  Dietary Guidelines for Americans, 2010  USDA's MyPlate  ASA Prophylaxis  Lung CA Screening    Bernice Calderon MD  [facility]  "

## 2021-02-24 NOTE — PATIENT INSTRUCTIONS
Increase wellbutrin back to 450mg - please update me with how this is going in about a month with bp updates    Refills at pharmacy - let me know when you need more    Fasting labs - schedule through [facility]    Shingrix today    1% hydrocortisone ointment (not cream)    Call for visit with [facility] dermatology          Preventive Health Recommendations  Female Ages 50 - 64    Yearly exam: See your health care provider every year in order to  o Review health changes.   o Discuss preventive care.    o Review your medicines if your doctor has prescribed any.      Get a Pap test every three years (unless you have an abnormal result and your provider advises testing more often).    If you get Pap tests with HPV test, you only need to test every 5 years, unless you have an abnormal result.     You do not need a Pap test if your uterus was removed (hysterectomy) and you have not had cancer.    You should be tested each year for STDs (sexually transmitted diseases) if you're at risk.     Have a mammogram every 1 to 2 years.    Have a colonoscopy at age 50, or have a yearly FIT test (stool test). These exams screen for colon cancer.      Have a cholesterol test every 5 years, or more often if advised.    Have a diabetes test (fasting glucose) every three years. If you are at risk for diabetes, you should have this test more often.     If you are at risk for osteoporosis (brittle bone disease), think about having a bone density scan (DEXA).    Shots: Get a flu shot each year. Get a tetanus shot every 10 years.    Nutrition:     Eat at least 5 servings of fruits and vegetables each day.    Eat whole-grain bread, whole-wheat pasta and brown rice instead of white grains and rice.    Get adequate Calcium and Vitamin D.     Lifestyle    Exercise at least 150 minutes a week (30 minutes a day, 5 days a week). This will help you control your weight and prevent disease.    Limit alcohol to one drink per day.    No smoking.     Wear  sunscreen to prevent skin cancer.     See your dentist every six months for an exam and cleaning.    See your eye doctor every 1 to 2 years.

## 2021-03-27 DIAGNOSIS — I10 ESSENTIAL HYPERTENSION: ICD-10-CM

## 2021-03-27 DIAGNOSIS — Z00.00 ROUTINE GENERAL MEDICAL EXAMINATION AT A HEALTH CARE FACILITY: ICD-10-CM

## 2021-03-27 LAB
ALBUMIN SERPL-MCNC: 3.5 G/DL (ref 3.4–5)
ALP SERPL-CCNC: 105 U/L (ref 40–150)
ALT SERPL W P-5'-P-CCNC: 28 U/L (ref 0–50)
ANION GAP SERPL CALCULATED.3IONS-SCNC: 3 MMOL/L (ref 3–14)
AST SERPL W P-5'-P-CCNC: 20 U/L (ref 0–45)
BILIRUB SERPL-MCNC: 0.5 MG/DL (ref 0.2–1.3)
BUN SERPL-MCNC: 18 MG/DL (ref 7–30)
CALCIUM SERPL-MCNC: 9.9 MG/DL (ref 8.5–10.1)
CHLORIDE SERPL-SCNC: 108 MMOL/L (ref 94–109)
CHOLEST SERPL-MCNC: 221 MG/DL
CO2 SERPL-SCNC: 27 MMOL/L (ref 20–32)
CREAT SERPL-MCNC: 1.08 MG/DL (ref 0.52–1.04)
CREAT UR-MCNC: 163 MG/DL
GFR SERPL CREATININE-BSD FRML MDRD: 55 ML/MIN/{1.73_M2}
GLUCOSE SERPL-MCNC: 94 MG/DL (ref 70–99)
HDLC SERPL-MCNC: 60 MG/DL
LDLC SERPL CALC-MCNC: 148 MG/DL
MICROALBUMIN UR-MCNC: 10 MG/L
MICROALBUMIN/CREAT UR: 6.38 MG/G CR (ref 0–25)
NONHDLC SERPL-MCNC: 161 MG/DL
POTASSIUM SERPL-SCNC: 4 MMOL/L (ref 3.4–5.3)
PROT SERPL-MCNC: 7.7 G/DL (ref 6.8–8.8)
SODIUM SERPL-SCNC: 138 MMOL/L (ref 133–144)
TRIGL SERPL-MCNC: 67 MG/DL
TSH SERPL DL<=0.005 MIU/L-ACNC: 2.23 MU/L (ref 0.4–4)

## 2021-03-27 PROCEDURE — 80061 LIPID PANEL: CPT | Performed by: PEDIATRICS

## 2021-03-27 PROCEDURE — 84443 ASSAY THYROID STIM HORMONE: CPT | Performed by: PEDIATRICS

## 2021-03-27 PROCEDURE — 82043 UR ALBUMIN QUANTITATIVE: CPT | Performed by: PEDIATRICS

## 2021-03-27 PROCEDURE — 80053 COMPREHEN METABOLIC PANEL: CPT | Performed by: PEDIATRICS

## 2021-03-27 PROCEDURE — 36415 COLL VENOUS BLD VENIPUNCTURE: CPT | Performed by: PEDIATRICS

## 2021-07-28 DIAGNOSIS — F33.1 MAJOR DEPRESSIVE DISORDER, RECURRENT EPISODE, MODERATE (H): ICD-10-CM

## 2021-07-29 RX ORDER — ESCITALOPRAM OXALATE 20 MG/1
20 TABLET ORAL DAILY
Qty: 90 TABLET | Refills: 1 | Status: SHIPPED | OUTPATIENT
Start: 2021-07-29 | End: 2022-02-22

## 2021-07-29 NOTE — TELEPHONE ENCOUNTER
Routing refill request to provider for review/approval because:  Elevated PHQ9    Bettie Marks, RN on 7/29/2021 at 3:10 PM         [FreeTextEntry1] : This is a 10-month-old female child with question of possible hip dysplasia and has not crawled but sits independently and has good trunk control.  She is able to stand and pull herself up to a stand with some mild assistance.  She has great strength and balance while holding onto objects.  She has not formally started cruising yet but she has great strength and core control.  There is a possibility of hip dysplasia which exists because of the history of breech presentation.  We will continue to observe.  I would like to see the child when she turns 1 year of age which is in about 2 months for a full formal evaluation.  If there is a any asymmetry then we will consider an x-ray of the hips to fully confirm that there is no hip dysplasia.  This will be determined on exam.  She will call to make that evaluation and visit.\par \par Diagnosis and prognosis fully explained and all questions were answered by the physician. Understanding was verbalized. Treatment plan was fully discussed and agreed upon by the child and family.\par \par Follow-up in 2 months for evaluation of the hips.  After which we may make in a decision on obtaining x-rays of the hips.

## 2021-08-10 DIAGNOSIS — F41.9 ANXIETY: ICD-10-CM

## 2021-08-13 ENCOUNTER — MYC MEDICAL ADVICE (OUTPATIENT)
Dept: PEDIATRICS | Facility: CLINIC | Age: 61
End: 2021-08-13

## 2021-08-13 RX ORDER — BUPROPION HYDROCHLORIDE 300 MG/1
300 TABLET ORAL EVERY MORNING
Qty: 90 TABLET | Refills: 1 | Status: SHIPPED | OUTPATIENT
Start: 2021-08-13 | End: 2022-03-09

## 2021-08-13 NOTE — TELEPHONE ENCOUNTER
Prescription approved per [facility] Refill Protocol.    Bettie Marks RN on 8/13/2021 at 11:32 AM

## 2021-09-05 ENCOUNTER — HEALTH MAINTENANCE LETTER (OUTPATIENT)
Age: 61
End: 2021-09-05

## 2022-02-20 ENCOUNTER — HEALTH MAINTENANCE LETTER (OUTPATIENT)
Age: 62
End: 2022-02-20

## 2022-02-21 ENCOUNTER — ANCILLARY PROCEDURE (OUTPATIENT)
Dept: MAMMOGRAPHY | Facility: CLINIC | Age: 62
End: 2022-02-21
Attending: PEDIATRICS
Payer: COMMERCIAL

## 2022-02-21 DIAGNOSIS — Z12.31 VISIT FOR SCREENING MAMMOGRAM: ICD-10-CM

## 2022-02-21 DIAGNOSIS — F33.1 MAJOR DEPRESSIVE DISORDER, RECURRENT EPISODE, MODERATE (H): ICD-10-CM

## 2022-02-21 PROCEDURE — 77067 SCR MAMMO BI INCL CAD: CPT | Mod: TC | Performed by: RADIOLOGY

## 2022-02-22 RX ORDER — ESCITALOPRAM OXALATE 20 MG/1
20 TABLET ORAL DAILY
Qty: 90 TABLET | Refills: 1 | Status: SHIPPED | OUTPATIENT
Start: 2022-02-22 | End: 2022-06-01

## 2022-02-22 NOTE — TELEPHONE ENCOUNTER
Routing refill request to provider for review/approval because:  PHQ-9 score:    PHQ 2/21/2021   PHQ-9 Total Score 6   Q9: Thoughts of better off dead/self-harm past 2 weeks Not at all       Pt does have appt in 3 months     Lizbeth Velasco, RN

## 2022-03-07 DIAGNOSIS — F41.9 ANXIETY: ICD-10-CM

## 2022-03-07 DIAGNOSIS — I10 ESSENTIAL HYPERTENSION: ICD-10-CM

## 2022-03-08 NOTE — TELEPHONE ENCOUNTER
Routing refill request to provider for review/approval because:  Labs out of range:  CR  Patient needs to be seen because it has been more than 1 year since last office visit.  Outdated BP    Bettie Marks RN on 3/8/2022 at 4:19 PM

## 2022-03-09 RX ORDER — BUPROPION HYDROCHLORIDE 300 MG/1
300 TABLET ORAL EVERY MORNING
Qty: 90 TABLET | Refills: 1 | Status: SHIPPED | OUTPATIENT
Start: 2022-03-09 | End: 2022-06-01

## 2022-03-09 RX ORDER — AMLODIPINE BESYLATE 10 MG/1
10 TABLET ORAL DAILY
Qty: 90 TABLET | Refills: 1 | Status: SHIPPED | OUTPATIENT
Start: 2022-03-09 | End: 2022-06-01

## 2022-04-17 ENCOUNTER — HEALTH MAINTENANCE LETTER (OUTPATIENT)
Age: 62
End: 2022-04-17

## 2022-04-26 DIAGNOSIS — F32.1 MODERATE MAJOR DEPRESSION (H): ICD-10-CM

## 2022-04-27 RX ORDER — BUPROPION HYDROCHLORIDE 150 MG/1
150 TABLET ORAL EVERY MORNING
Qty: 90 TABLET | Refills: 0 | Status: SHIPPED | OUTPATIENT
Start: 2022-04-27 | End: 2022-06-01

## 2022-04-27 NOTE — TELEPHONE ENCOUNTER
[facility] calling needing this refill filled asap. Patient is completely out.    The pharmacy can be reached at [phone]    Marychuy JEROME  [facility]   [facility]

## 2022-04-27 NOTE — TELEPHONE ENCOUNTER
Refill extended, pt has appointment scheduled  Prescription approved per [facility] Refill Protocol.  Kaylin Moon RN, BSN  [facility]

## 2022-06-01 ENCOUNTER — OFFICE VISIT (OUTPATIENT)
Dept: PEDIATRICS | Facility: CLINIC | Age: 62
End: 2022-06-01
Payer: COMMERCIAL

## 2022-06-01 VITALS
SYSTOLIC BLOOD PRESSURE: 92 MMHG | BODY MASS INDEX: 29.08 KG/M2 | RESPIRATION RATE: 22 BRPM | HEIGHT: 64 IN | WEIGHT: 170.3 LBS | DIASTOLIC BLOOD PRESSURE: 62 MMHG | TEMPERATURE: 97.5 F | HEART RATE: 77 BPM | OXYGEN SATURATION: 98 %

## 2022-06-01 DIAGNOSIS — I10 ESSENTIAL HYPERTENSION: ICD-10-CM

## 2022-06-01 DIAGNOSIS — F41.9 ANXIETY: ICD-10-CM

## 2022-06-01 DIAGNOSIS — Z00.00 ROUTINE GENERAL MEDICAL EXAMINATION AT A HEALTH CARE FACILITY: Primary | ICD-10-CM

## 2022-06-01 DIAGNOSIS — F33.1 MAJOR DEPRESSIVE DISORDER, RECURRENT EPISODE, MODERATE (H): ICD-10-CM

## 2022-06-01 DIAGNOSIS — M54.9 UPPER BACK PAIN ON RIGHT SIDE: ICD-10-CM

## 2022-06-01 PROCEDURE — 99396 PREV VISIT EST AGE 40-64: CPT | Performed by: PEDIATRICS

## 2022-06-01 RX ORDER — BUPROPION HYDROCHLORIDE 300 MG/1
300 TABLET ORAL EVERY MORNING
Qty: 90 TABLET | Refills: 3 | Status: SHIPPED | OUTPATIENT
Start: 2022-06-01 | End: 2023-06-05

## 2022-06-01 RX ORDER — BUPROPION HYDROCHLORIDE 150 MG/1
150 TABLET ORAL EVERY MORNING
Qty: 90 TABLET | Refills: 3 | Status: SHIPPED | OUTPATIENT
Start: 2022-06-01 | End: 2023-06-05

## 2022-06-01 RX ORDER — AMLODIPINE BESYLATE 5 MG/1
5 TABLET ORAL DAILY
Qty: 90 TABLET | Refills: 3 | Status: SHIPPED | OUTPATIENT
Start: 2022-06-01 | End: 2023-06-05

## 2022-06-01 RX ORDER — ESCITALOPRAM OXALATE 20 MG/1
20 TABLET ORAL DAILY
Qty: 90 TABLET | Refills: 3 | Status: SHIPPED | OUTPATIENT
Start: 2022-06-01 | End: 2023-06-05

## 2022-06-01 SDOH — ECONOMIC STABILITY: TRANSPORTATION INSECURITY
IN THE PAST 12 MONTHS, HAS LACK OF TRANSPORTATION KEPT YOU FROM MEETINGS, WORK, OR FROM GETTING THINGS NEEDED FOR DAILY LIVING?: NO

## 2022-06-01 SDOH — HEALTH STABILITY: PHYSICAL HEALTH: ON AVERAGE, HOW MANY DAYS PER WEEK DO YOU ENGAGE IN MODERATE TO STRENUOUS EXERCISE (LIKE A BRISK WALK)?: 3 DAYS

## 2022-06-01 SDOH — HEALTH STABILITY: PHYSICAL HEALTH: ON AVERAGE, HOW MANY MINUTES DO YOU ENGAGE IN EXERCISE AT THIS LEVEL?: 60 MIN

## 2022-06-01 SDOH — ECONOMIC STABILITY: TRANSPORTATION INSECURITY
IN THE PAST 12 MONTHS, HAS THE LACK OF TRANSPORTATION KEPT YOU FROM MEDICAL APPOINTMENTS OR FROM GETTING MEDICATIONS?: NO

## 2022-06-01 SDOH — ECONOMIC STABILITY: FOOD INSECURITY: WITHIN THE PAST 12 MONTHS, YOU WORRIED THAT YOUR FOOD WOULD RUN OUT BEFORE YOU GOT MONEY TO BUY MORE.: NEVER TRUE

## 2022-06-01 SDOH — ECONOMIC STABILITY: INCOME INSECURITY: HOW HARD IS IT FOR YOU TO PAY FOR THE VERY BASICS LIKE FOOD, HOUSING, MEDICAL CARE, AND HEATING?: NOT HARD AT ALL

## 2022-06-01 SDOH — ECONOMIC STABILITY: INCOME INSECURITY: IN THE LAST 12 MONTHS, WAS THERE A TIME WHEN YOU WERE NOT ABLE TO PAY THE MORTGAGE OR RENT ON TIME?: NO

## 2022-06-01 SDOH — ECONOMIC STABILITY: FOOD INSECURITY: WITHIN THE PAST 12 MONTHS, THE FOOD YOU BOUGHT JUST DIDN'T LAST AND YOU DIDN'T HAVE MONEY TO GET MORE.: NEVER TRUE

## 2022-06-01 ASSESSMENT — ENCOUNTER SYMPTOMS
SHORTNESS OF BREATH: 0
NERVOUS/ANXIOUS: 0
BREAST MASS: 0
FEVER: 0
HEMATOCHEZIA: 0
SORE THROAT: 0
WEAKNESS: 0
FREQUENCY: 0
COUGH: 0
HEADACHES: 0
PALPITATIONS: 0
MYALGIAS: 1
DIZZINESS: 0
ARTHRALGIAS: 0
JOINT SWELLING: 0
DYSURIA: 0
EYE PAIN: 0
HEMATURIA: 0
NAUSEA: 0
DIARRHEA: 0
ABDOMINAL PAIN: 0
CONSTIPATION: 0
CHILLS: 0
HEARTBURN: 0
PARESTHESIAS: 1

## 2022-06-01 ASSESSMENT — SOCIAL DETERMINANTS OF HEALTH (SDOH)
IN A TYPICAL WEEK, HOW MANY TIMES DO YOU TALK ON THE PHONE WITH FAMILY, FRIENDS, OR NEIGHBORS?: NEVER
ARE YOU MARRIED, WIDOWED, DIVORCED, SEPARATED, NEVER MARRIED, OR LIVING WITH A PARTNER?: NEVER MARRIED
HOW OFTEN DO YOU GET TOGETHER WITH FRIENDS OR RELATIVES?: ONCE A WEEK
DO YOU BELONG TO ANY CLUBS OR ORGANIZATIONS SUCH AS CHURCH GROUPS UNIONS, FRATERNAL OR ATHLETIC GROUPS, OR SCHOOL GROUPS?: YES
HOW OFTEN DO YOU ATTEND CHURCH OR RELIGIOUS SERVICES?: NEVER

## 2022-06-01 ASSESSMENT — LIFESTYLE VARIABLES
HOW OFTEN DO YOU HAVE A DRINK CONTAINING ALCOHOL: NEVER
HOW MANY STANDARD DRINKS CONTAINING ALCOHOL DO YOU HAVE ON A TYPICAL DAY: PATIENT DOES NOT DRINK
SKIP TO QUESTIONS 9-10: 1
AUDIT-C TOTAL SCORE: 0
HOW OFTEN DO YOU HAVE SIX OR MORE DRINKS ON ONE OCCASION: NEVER

## 2022-06-01 ASSESSMENT — PATIENT HEALTH QUESTIONNAIRE - PHQ9
10. IF YOU CHECKED OFF ANY PROBLEMS, HOW DIFFICULT HAVE THESE PROBLEMS MADE IT FOR YOU TO DO YOUR WORK, TAKE CARE OF THINGS AT HOME, OR GET ALONG WITH OTHER PEOPLE: SOMEWHAT DIFFICULT
SUM OF ALL RESPONSES TO PHQ QUESTIONS 1-9: 4
SUM OF ALL RESPONSES TO PHQ QUESTIONS 1-9: 4

## 2022-06-01 NOTE — PATIENT INSTRUCTIONS
Decrease amlodipine dosing to 5mg and monitor BP - alert me with any changes that worry you    Fasting labs - schedule when works for you    Thank you for being UTD on immunizations    Keep eye on mood - alert me with changes

## 2022-06-01 NOTE — PROGRESS NOTES
SUBJECTIVE:   CC: Sanjuanita Saul is an 61 year old woman who presents for preventive health visit.       Patient has been advised of split billing requirements and indicates understanding: Yes  Healthy Habits:     Getting at least 3 servings of Calcium per day:  Yes    Bi-annual eye exam:  Yes    Dental care twice a year:  Yes    Sleep apnea or symptoms of sleep apnea:  None    Diet:  Regular (no restrictions)    Frequency of exercise:  2-3 days/week    Duration of exercise:  15-30 minutes    Taking medications regularly:  Yes    Medication side effects:  None    PHQ-2 Total Score: 2    Additional concerns today:  No      Pt has additional concerns with muscle/area that runs from neck/spine into back of shoulder - having pain on and off, seems to have gotten worse within the last 6 mo or so. Would like to know how to address this.      Today's PHQ-2 Score:   PHQ-2 ( 1999 Pf[employer]) 6/1/2022   Q1: Little interest or pleasure in doing things 1   Q2: Feeling down, depressed or hopeless 1   PHQ-2 Score 2   PHQ-2 Total Score (12-17 Years)- Positive if 3 or more points; Administer PHQ-A if positive -   Q1: Little interest or pleasure in doing things Several days   Q2: Feeling down, depressed or hopeless Several days   PHQ-2 Score 2       Abuse: Current or Past (Physical, Sexual or Emotional) - No  Do you feel safe in your environment? Yes        Social History     Tobacco Use     Smoking status: Never Smoker     Smokeless tobacco: Never Used   Substance Use Topics     Alcohol use: No     If you drink alcohol do you typically have >3 drinks per day or >7 drinks per week? No    Alcohol Use 6/1/2022   Prescreen: >3 drinks/day or >7 drinks/week? Not Applicable   Prescreen: >3 drinks/day or >7 drinks/week? -       Reviewed orders with patient.  Reviewed health maintenance and updated orders accordingly - Yes  Lab work is in process  Labs reviewed in EPIC    Breast Cancer Screening:  Any new diagnosis of family breast, ovarian,  or bowel cancer? No    FHS-7:   Breast CA Risk Assessment (FHS-7) 2/21/2022 6/1/2022   Did any of your first-degree relatives have breast or ovarian cancer? Yes No   Did any of your relatives have bilateral breast cancer? No No   Did any man in your family have breast cancer? No -   Did any woman in your family have breast and ovarian cancer? No -   Did any woman in your family have breast cancer before age 50 y? No -   Do you have 2 or more relatives with breast and/or ovarian cancer? No -   Do you have 2 or more relatives with breast and/or bowel cancer? Yes -       Mammogram Screening: Recommended mammography every 1-2 years with patient discussion and risk factor consideration  Pertinent mammograms are reviewed under the imaging tab.    History of abnormal Pap smear: No, has not been sexually active.  Screening continued at her request.     Reviewed and updated as needed this visit by clinical staff   Tobacco  Allergies  Meds   Med Hx  Surg Hx  Fam Hx  Soc Hx          Reviewed and updated as needed this visit by Provider         [facility]             Has gone on multiple mule-riding adventures - [place], [place]    HTN - has been very well controlled.  Now has had lower pressures with intentional weight loss.  Taking amlodipine regularly.  A few episodes of dizziness.    Depression/anxiety - under reasonable control on current medications, healthy coping mechanisms    Recent normal mammogram    Exercise - got a new horse last year -  named Caesar    Skin spots - following with dermatology - does have a couple new flaking spots on side of nose since [place] visit last week - plans to schedule follow up with derm soon.  AKs and SKs diagnosed on last derm visit last summer    Posterior upper neck/back pain - Years ago, started to have pain between neck and shoulder on the right side.  Would notice when she was in the [employer] and would carry heavy pack.  Since losing weight, this same area is more  "sensitive more easily - feels especially when riding horse.  No numbness/tingling/weakness in the RUE.  Is right hand dominant.    Has been working to lose weight intentionally - used Noom at start   Wt Readings from Last 4 Encounters:   06/01/22 77.2 kg (170 lb 4.8 oz)   02/24/21 85.3 kg (188 lb)   04/17/19 88.3 kg (194 lb 11.2 oz)   04/04/19 88.5 kg (195 lb)         Review of Systems   Constitutional: Negative for chills and fever.   HENT: Negative for congestion, ear pain, hearing loss and sore throat.    Eyes: Negative for pain and visual disturbance.   Respiratory: Negative for cough and shortness of breath.    Cardiovascular: Negative for chest pain, palpitations and peripheral edema.   Gastrointestinal: Negative for abdominal pain, constipation, diarrhea, heartburn, hematochezia and nausea.   Breasts:  Negative for tenderness, breast mass and discharge.   Genitourinary: Negative for dysuria, frequency, genital sores, hematuria, pelvic pain, urgency, vaginal bleeding and vaginal discharge.   Musculoskeletal: Positive for myalgias. Negative for arthralgias and joint swelling.   Skin: Negative for rash.   Neurological: Positive for paresthesias. Negative for dizziness, weakness and headaches.   Psychiatric/Behavioral: Negative for mood changes. The patient is not nervous/anxious.           OBJECTIVE:   BP 92/62 (BP Location: Right arm, Patient Position: Sitting, Cuff Size: Adult Large)   Pulse 77   Temp 97.5  F (36.4  C) (Tympanic)   Resp 22   Ht 1.626 m (5' 4\")   Wt 77.2 kg (170 lb 4.8 oz)   [facility] 01/01/2010   SpO2 98%   BMI 29.23 kg/m     Wt Readings from Last 4 Encounters:   06/01/22 77.2 kg (170 lb 4.8 oz)   02/24/21 85.3 kg (188 lb)   04/17/19 88.3 kg (194 lb 11.2 oz)   04/04/19 88.5 kg (195 lb)       Physical Exam  GENERAL: healthy, alert and no distress  EYES: Eyes grossly normal to inspection, PERRL and conjunctivae and sclerae normal  HENT: ear canals and TM's normal, nose and mouth without ulcers " "or lesions  NECK: no adenopathy, no asymmetry, masses, or scars and thyroid normal to palpation  RESP: lungs clear to auscultation - no rales, rhonchi or wheezes  CV: regular rate and rhythm, normal S1 S2, no S3 or S4, no murmur, click or rub, no peripheral edema and peripheral pulses strong  ABDOMEN: soft, nontender, no hepatosplenomegaly, no masses and bowel sounds normal  MS: tender to palpation over right suprascapular musculature, normal right arm ROM, no gross joint deformities  SKIN: erythematous, scaling patches 3-5mm diameter sides of nasal bridge bilaterally, fair skin with freckling over sun exposed areas  NEURO: Normal strength and tone, mentation intact and speech normal  PSYCH: mentation appears normal, affect normal/bright    Diagnostic Test Results:  pending    ASSESSMENT/PLAN:       ICD-10-CM    1. Routine general medical examination at a health care facility  Z00.00 Lipid panel reflex to direct LDL Fasting     Comprehensive metabolic panel (BMP + Alb, Alk Phos, ALT, AST, Total. Bili, TP)     TSH with free T4 reflex   2. Essential hypertension  I10 amLODIPine (NORVASC) 5 MG tablet    Low bp with weight loss - plan to decrease dose of amlodipine and patient will continue to follow bp measures at work/home (works [place] [employer] [place]     3. Anxiety  F41.9 escitalopram (LEXAPRO) 20 MG tablet  Well controlled, continue current medications     4. Major depressive disorder, recurrent episode, moderate (H)  F33.1 escitalopram (LEXAPRO) 20 MG tablet     buPROPion (WELLBUTRIN XL) 300 MG 24 hr tablet     buPROPion (WELLBUTRIN XL) 150 MG 24 hr tablet  Well controlled, continue current medications     5. Upper back pain on right side  M54.9 Physical Therapy Referral  Plan physical therapy         COUNSELING:  Reviewed preventive health counseling, as reflected in patient instructions    Estimated body mass index is 29.23 kg/m  as calculated from the following:    Height as of this encounter: 1.626 m (5' 4\").    Weight as " of this encounter: 77.2 kg (170 lb 4.8 oz).    Weight management plan: Discussed healthy diet and exercise guidelines    She reports that she has never smoked. She has never used smokeless tobacco.      Counseling Resources:  ATP IV Guidelines  Pooled Cohorts Equation Calculator  Breast Cancer Risk Calculator  BRCA-Related Cancer Risk Assessment: FHS-7 Tool  FRAX Risk Assessment  ICSI Preventive Guidelines  Dietary Guidelines for Americans, 2010  Naked's MyPlate  ASA Prophylaxis  Lung CA Screening    Bernice Calderon MD  [facility]  Answers for HPI/ROS submitted by the patient on 6/1/2022  If you checked off any problems, how difficult have these problems made it for you to do your work, take care of things at home, or get along with other people?: Somewhat difficult  PHQ9 TOTAL SCORE: 4

## 2022-06-12 ENCOUNTER — LAB (OUTPATIENT)
Dept: LAB | Facility: CLINIC | Age: 62
End: 2022-06-12
Payer: COMMERCIAL

## 2022-06-12 DIAGNOSIS — Z00.00 ROUTINE GENERAL MEDICAL EXAMINATION AT A HEALTH CARE FACILITY: ICD-10-CM

## 2022-06-12 LAB
ALBUMIN SERPL-MCNC: 3.6 G/DL (ref 3.4–5)
ALP SERPL-CCNC: 102 U/L (ref 40–150)
ALT SERPL W P-5'-P-CCNC: 22 U/L (ref 0–50)
ANION GAP SERPL CALCULATED.3IONS-SCNC: 3 MMOL/L (ref 3–14)
AST SERPL W P-5'-P-CCNC: 23 U/L (ref 0–45)
BILIRUB SERPL-MCNC: 0.4 MG/DL (ref 0.2–1.3)
BUN SERPL-MCNC: 17 MG/DL (ref 7–30)
CALCIUM SERPL-MCNC: 9.4 MG/DL (ref 8.5–10.1)
CHLORIDE BLD-SCNC: 108 MMOL/L (ref 94–109)
CHOLEST SERPL-MCNC: 195 MG/DL
CO2 SERPL-SCNC: 27 MMOL/L (ref 20–32)
CREAT SERPL-MCNC: 1.03 MG/DL (ref 0.52–1.04)
FASTING STATUS PATIENT QL REPORTED: ABNORMAL
GFR SERPL CREATININE-BSD FRML MDRD: 61 ML/MIN/1.73M2
GLUCOSE BLD-MCNC: 97 MG/DL (ref 70–99)
HDLC SERPL-MCNC: 57 MG/DL
LDLC SERPL CALC-MCNC: 123 MG/DL
NONHDLC SERPL-MCNC: 138 MG/DL
POTASSIUM BLD-SCNC: 4.2 MMOL/L (ref 3.4–5.3)
PROT SERPL-MCNC: 7.5 G/DL (ref 6.8–8.8)
SODIUM SERPL-SCNC: 138 MMOL/L (ref 133–144)
TRIGL SERPL-MCNC: 73 MG/DL
TSH SERPL DL<=0.005 MIU/L-ACNC: 2.32 MU/L (ref 0.4–4)

## 2022-06-12 PROCEDURE — 80061 LIPID PANEL: CPT

## 2022-06-12 PROCEDURE — 36415 COLL VENOUS BLD VENIPUNCTURE: CPT

## 2022-06-12 PROCEDURE — 84443 ASSAY THYROID STIM HORMONE: CPT

## 2022-06-12 PROCEDURE — 80053 COMPREHEN METABOLIC PANEL: CPT

## 2022-07-09 ENCOUNTER — OFFICE VISIT (OUTPATIENT)
Dept: URGENT CARE | Facility: URGENT CARE | Age: 62
End: 2022-07-09
Payer: COMMERCIAL

## 2022-07-09 ENCOUNTER — NURSE TRIAGE (OUTPATIENT)
Dept: NURSING | Facility: CLINIC | Age: 62
End: 2022-07-09

## 2022-07-09 VITALS
RESPIRATION RATE: 12 BRPM | DIASTOLIC BLOOD PRESSURE: 77 MMHG | SYSTOLIC BLOOD PRESSURE: 114 MMHG | OXYGEN SATURATION: 98 % | TEMPERATURE: 98.3 F | HEART RATE: 85 BPM
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
## 2022-07-09 DIAGNOSIS — S61.259A CAT BITE OF FINGER, INITIAL ENCOUNTER: Primary | ICD-10-CM

## 2022-07-09 DIAGNOSIS — Z79.2 NEED FOR PROPHYLACTIC ANTIBIOTIC: ICD-10-CM

## 2022-07-09 PROCEDURE — 99213 OFFICE O/P EST LOW 20 MIN: CPT | Performed by: FAMILY MEDICINE

## 2022-07-09 NOTE — PROGRESS NOTES
Chief Complaint   Patient presents with     Urgent Care     Cat Bite     Cat bite-happened today        Medical Decision Making:  Sanjuanita Saul is a 61 year old female in the clinic for cat bite which happened today morning on most 8 hours back she has a foster cat who is immunized.  She has couple of puncture wounds in the left ring finger on the dorsum aspect in the middle phalanx as there is no redness streaking or any limitation of movement of the fingers I am not concerned about any tendinitis.  Patient is immunized against tetanus was advised to start on the antibiotic.  Discussed with patient about any worsening redness or streaking.    Differential Diagnosis:  Wound, cellulitis, cat bite,  Sanjuanita was seen today for urgent care and cat bite.    Diagnoses and all orders for this visit:    Cat bite of finger, initial encounter    Need for prophylactic antibiotic  -     amoxicillin-clavulanate (AUGMENTIN) 875-125 MG tablet; Take 1 tablet by mouth 2 times daily        Tylenol and Rest    Started on augmentin   No signs of foreign body or dog teeth in wound.  Dog is known so will have them observe for signs of rabies; no rabies shots indicated from ED. I discussed with the patient the risk versus benefits of closing the wound and emphasized strict return precautions for any sign of infection including spreading redness, warmth, fevers, etc). Started on Augmentin. Tetanus is up-to-date..  See orders in Epic  Pt verbalized and agreed with the plan and is aware of the worsening symptoms for which would need to follow up .  Pt was stable during time of discharge from the clinic     X-Ray was not done.  If not improving or if conditions worsens over the next 12-24 hours, go to the Emergency Department  Sanjaunita Saul is a 61 year old female presenting with a chief complaint of    Chief Complaint   Patient presents with     Urgent Care     Cat Bite     Cat bite-happened today        SUBJECTIVE:  Sanjuanita Saul is a 61 year old  female who presents with a chief complaint of an animal bite on the fingers.  She was bitten by a cat today.   Cicumstances of bite: unprovoked attack.  Severity: moderate, bite with skin break.  Animal's immunizations up to date   Associated symptoms: immediate pain    last tetanus booster 3 years ago    Past Medical History:   Diagnosis Date     Allergic rhinitis, cause unspecified     Allergic rhinitis     Depressive disorder 2000     Depressive disorder, not elsewhere classified      Essential hypertension 4/17/2019       Current Outpatient Medications:      amLODIPine (NORVASC) 5 MG tablet, Take 1 tablet (5 mg) by mouth daily, Disp: 90 tablet, Rfl: 3     ASPIRIN 325 MG OR TABS, 2 TABLETS EVERY 4 TO 6 HOURS AS NEEDED, Disp: , Rfl:      buPROPion (WELLBUTRIN XL) 150 MG 24 hr tablet, Take 1 tablet (150 mg) by mouth every morning In combination with 300mg tab for total dose 450mg, Disp: 90 tablet, Rfl: 3     buPROPion (WELLBUTRIN XL) 300 MG 24 hr tablet, Take 1 tablet (300 mg) by mouth every morning, Disp: 90 tablet, Rfl: 3     escitalopram (LEXAPRO) 20 MG tablet, Take 1 tablet (20 mg) by mouth daily, Disp: 90 tablet, Rfl: 3     multivitamin w/minerals (THERA-VIT-M) tablet, Take 1 tablet by mouth daily, Disp: , Rfl:      Omega-3 Fatty Acids (OMEGA-3 FISH OIL PO), , Disp: , Rfl:   Social History     Tobacco Use     Smoking status: Never Smoker     Smokeless tobacco: Never Used   Substance Use Topics     Alcohol use: No       ROS:  10 point ROS of systems including Constitutional, Eyes, Respiratory, Cardiovascular, Gastroenterology, Genitourinary, Integumentary, Muscularskeletal, Psychiatric were all negative except for pertinent positives noted in my HPI           OBJECTIVE:  /77   Pulse 85   Temp 98.3  F (36.8  C) (Oral)   Resp 12   LMP 01/01/2010   SpO2 98%   Breastfeeding No   GENERAL: healthy, alert no acute distress  SKIN: abrasion, puncture wound noted on the dorsum of the left ring finger over the  middle phalanx with a puncture wound and a break in the skin from abrasion   There is tenderness to palpation of fingers over the bite olivia  MS:extremities normal- no gross deformities noted,  FROM noted in all extremities        (Note was completed, in part, with [employer]izt[employer] voice-recognition software. Documentation reviewed, but some grammatical, spelling, and word errors may remain.)  Danyell Real MD

## 2022-07-09 NOTE — TELEPHONE ENCOUNTER
Pt bit by a cat this morning and wanting to know if she can have an Rx prescribed - pt advised to be seen at Urgent Care.  Pt also inquiring when last tetanus, pt had TDAP 4/4/2019, this information given to pt.  Kiara Middleton RN  [facility] Nurse Advisor    Reason for Disposition    [1] Puncture wound (hole through the skin) AND [2] from a cat bite (or deep claw puncture wound)    Additional Information    Negative: [1] Major bleeding (e.g., actively dripping or spurting) AND [2] can't be stopped    Negative: Sounds like a life-threatening emergency to the triager    Negative: Snake bite    Negative: Bite, wound, or sting from fish    Negative: [1] Any break in skin from BITE (e.g., cut, puncture or scratch) AND[2] WILD animal at risk for RABIES (e.g., bat, raccoon, cardenas, skunk, coyote, other carnivores)    Negative: [1] Any break in skin from BITE (e.g., cut, puncture or scratch) AND[2] PET animial (e.g., dog, cat, or ferret) at risk for RABIES (e.g., sick, stray, unprovoked bite, developing country)    Negative: [1] Any break in skin from BITE (e.g., cut, puncture or scratch) AND[2] monkey    Negative: [1] EXPOSURE of non-intact skin (e.g., exposed person has dermatitis, abrasion, wound) AND[2] with animal BODY FLUID (e.g., saliva such as licking, blood, brain) AND[3] animal at high-risk for RABIES (e.g., bat, raccoon, cardenas, skunk, coyote, other carnivores)    Negative: [1] Cut (length > 1/8 inch or 3 mm) or skin tear AND [2] any animal    Negative: [1] Bleeding AND [2] won't stop after 10 minutes of direct pressure (using correct technique)    Negative: Description of bite sounds severe to the triager    Protocols used: ANIMAL BITE-A-

## 2022-10-23 ENCOUNTER — HEALTH MAINTENANCE LETTER (OUTPATIENT)
Age: 62
End: 2022-10-23

## 2023-03-08 ENCOUNTER — VIRTUAL VISIT (OUTPATIENT)
Dept: FAMILY MEDICINE | Facility: CLINIC | Age: 63
End: 2023-03-08
Payer: COMMERCIAL

## 2023-03-08 DIAGNOSIS — I10 ESSENTIAL HYPERTENSION: ICD-10-CM

## 2023-03-08 DIAGNOSIS — F33.9 EPISODE OF RECURRENT MAJOR DEPRESSIVE DISORDER, UNSPECIFIED DEPRESSION EPISODE SEVERITY (H): ICD-10-CM

## 2023-03-08 DIAGNOSIS — U07.1 INFECTION DUE TO 2019 NOVEL CORONAVIRUS: Primary | ICD-10-CM

## 2023-03-08 PROCEDURE — 96127 BRIEF EMOTIONAL/BEHAV ASSMT: CPT | Mod: 93 | Performed by: FAMILY MEDICINE

## 2023-03-08 PROCEDURE — 99214 OFFICE O/P EST MOD 30 MIN: CPT | Mod: CS | Performed by: FAMILY MEDICINE

## 2023-03-08 ASSESSMENT — PATIENT HEALTH QUESTIONNAIRE - PHQ9
SUM OF ALL RESPONSES TO PHQ QUESTIONS 1-9: 2
SUM OF ALL RESPONSES TO PHQ QUESTIONS 1-9: 2
10. IF YOU CHECKED OFF ANY PROBLEMS, HOW DIFFICULT HAVE THESE PROBLEMS MADE IT FOR YOU TO DO YOUR WORK, TAKE CARE OF THINGS AT HOME, OR GET ALONG WITH OTHER PEOPLE: NOT DIFFICULT AT ALL

## 2023-03-08 NOTE — PROGRESS NOTES
"Sanjuanita is a 62 year old who is being evaluated via a billable telephone visit.      What phone number would you like to be contacted at?    [phone]        How would you like to obtain your AVS? Francia  Distant Location (provider location):  Off-site    Assessment & Plan     Infection due to 2019 novel coronavirus  Positive home testing with symptoms starting yesterday.  Dose adjusted for the previous kidney function. See instructions.   - nirmatrelvir and ritonavir (PAXLOVID) 150 mg/100 mg therapy pack; Take 2 tablets by mouth 2 times daily for 5 days (Take one tablet of Nirmatelvir and 1 tablet of Ritonavir twice daily for 5 days)    Episode of recurrent major depressive disorder, unspecified depression episode severity (H)  Reports good control of symptoms - treated with combo lexapro and wellbutrin.  Monitor symptoms while on paxlovid.     Essential hypertension  BP has been controlled.  Has home monitor.  Decrease Amlodipine to 2.5 mg dose for duration of paxlovid treatment.      Review of the result(s) of each unique test - serial Creatinine/GFR  Prescription drug management         BMI:   Estimated body mass index is 29.23 kg/m  as calculated from the following:    Height as of 6/1/22: 1.626 m (5' 4\").    Weight as of 6/1/22: 77.2 kg (170 lb 4.8 oz).   Weight management plan: Patient was referred to their PCP to discuss a diet and exercise plan.    COVID-19 positive patient.  Encounter for consideration of medication intervention. Patient does qualify for a prescription. Full discussion with patient including medication options, risks and benefits. Potential drug interactions reviewed with patient.     Treatment Planned Paxlovid, Rx sent to [facility]  [facility] [phone] [address] , [address]  [place] [address]    Hours:  Mon-Fri: 8:00am - 6:00p  Sat: 9:00a - 12:30p     Drive Thru available  Temporary change to home medications: Decrease Amlodipine to 2.5 mg while on " "the Paxlovid - can resume 5 mg dose when treatment is completed.  Discussed that Bupropion concentration may be decreased on the medication but no change in medication dose needed.      Estimated body mass index is 29.23 kg/m  as calculated from the following:    Height as of 6/1/22: 1.626 m (5' 4\").    Weight as of 6/1/22: 77.2 kg (170 lb 4.8 oz).  GFR Estimate   Date Value Ref Range Status   06/12/2022 61 >60 mL/min/1.73m2 Final     Comment:     Effective December 21, 2021 eGFRcr in adults is calculated using the 2021 CKD-EPI creatinine equation which includes age and gender (Jocelyn et al., NEJM, DOI: 10.1056/JPPKjq2039705)   03/27/2021 55 (L) >60 mL/min/[1.73_m2] Final     Comment:     Non  GFR Calc  Starting 12/18/2018, serum creatinine based estimated GFR (eGFR) will be   calculated using the Chronic Kidney Disease Epidemiology Collaboration   (CKD-EPI) equation.       No results found for: [id number]    Return in about 2 weeks (around 3/22/2023) for as needed for persistent symptoms.    Marla Olivo MD  [facility]   Sanjuanita is a 62 year old presenting for the following health issues:  Covid Concern      History of Present Illness       Reason for visit:  Covid    She eats 2-3 servings of fruits and vegetables daily.She consumes 1 sweetened beverage(s) daily.She exercises with enough effort to increase her heart rate 30 to 60 minutes per day.  She exercises with enough effort to increase her heart rate 5 days per week.   She is taking medications regularly.    Today's PHQ-9         PHQ-9 Total Score: 2    PHQ-9 Q9 Thoughts of better off dead/self-harm past 2 weeks :   Not at all    How difficult have these problems made it for you to do your work, take care of things at home, or get along with other people: Not difficult at all         COVID-19 Symptom Review  How many days ago did these symptoms start? 3-7-23  Tested positive: 3-8-23  Are any of the " following symptoms significant for you?    New or worsening difficulty breathing? No    Worsening cough? No    Fever or chills? Yes, I felt feverish or had chills.    Headache: YES    Sore throat: YES    Chest pain: No    Diarrhea: No    Body aches? YES    What treatments has patient tried? Tylenol   Does patient live in a nursing home, group home, or shelter? No  Does patient have a way to get food/medications during quarantined? Yes, I have a friend or family member who can help me.            Review of Systems   Constitutional, HEENT, cardiovascular, pulmonary, gi and gu systems are negative, except as otherwise noted.      Objective           Vitals:  No vitals were obtained today due to virtual visit.    Physical Exam   alert and no distress  PSYCH: Alert and oriented times 3; coherent speech, normal   rate and volume, able to articulate logical thoughts, able   to abstract reason, no tangential thoughts, no hallucinations   or delusions  Her affect is normal  RESP: No cough, no audible wheezing, able to talk in full sentences  Remainder of exam unable to be completed due to telephone visits        Review indicated episodically low GFR previously.         Phone call duration: 14 minutes

## 2023-03-08 NOTE — PATIENT INSTRUCTIONS
Piero: [phone] (M-F 8a-6p, Sat 9a-12:30p)  Paxlovid script is sent to the pharmacy.  Decrease Amlodipine to 2.5 mg while on the Paxlovid - can resume 5 mg dose when treatment is completed.    NON PRESCRIPTION TREATMENT    Mucinex 600 mg 1 tabs twice a day   Increase humidity to 30-40% in bedroom at night - vaporizer  Avoid decongestant  Saline nasal spray as needed  Increase fluid intake  Benadryl 25mg 1/2 - 1 hour before bed time  Maintain 8 hr minimum of sleep at night  Robitussin DM cough gels for cough  Tylenol as needed for pain/fever.    COVID-19 Outpatient Treatments  Your care team can help you find the best treatments for COVID-19. Talk to a health care provider or refer to the FDA medicine fact sheets below.  Important: You can't have Paxlovid or molnupiravir if you're starting the medicine more than 5 days after your symptoms have started.  Paxlovid: https://www.fda.gov/media/335890/download  Molnupiravir (Lagevrio): https://www.fda.gov/media/526309/download  Paxlovid (nimatrelvir and ritonavir)  How it works  Two medicines (nirmatrelvir and ritonavir) are taken together. They stop the virus from growing. Less amount of virus is easier for your body to fight.  Benefits  Lowers risk of a hospital stay or death from COVID-19.  How to take  Medicine comes in a daily container with both medicine tablets. Take by mouth twice daily (once in the morning, once at night) for 5 days.  The number of tablets to take varies by patient.  Don't chew or break capsules. Swallow whole.  When to take  Take as soon as possible after positive COVID-19 test result, and within 5 days of your first symptoms.  Who can take it  Patients must be 12 years or older, weigh at least 88 pounds, and have tested positive for COVID-19. Paxlovid is the preferred treatment for pregnant patients.  Possible side effects  Can cause altered sense of taste, diarrhea (loose, watery stools), high blood pressure, muscle aches.  Medicine  conflicts  Some medicines may conflict with Paxlovid and may cause serious side effects.  Tell your care team about all the medicines you take, including prescription and over-the-counter medicines, vitamins, and herbal supplements.  Your care team will review your medicines to make sure that you can safely take Paxlovid.  Cautions  Paxlovid is not advised for patients with severe kidney or liver disease. If you have kidney or liver problems, the dose may need to be changed.  If you're pregnant or breastfeeding, talk to your care team about your options.  If you take hormonal birth control (such as the Pill), then you or your partner should also use a non-hormonal form of birth control (such as a condom). Keep doing this for 1 menstrual cycle after your last dose of Paxlovid.  Molnupiravir (Lagevrio)  How it works  Stops the virus from growing. Less amount of virus is easier for your body to fight.  Benefits  Lowers risk of a hospital stay or death from COVID-19.  How to take  Take 4 capsules by mouth every 12 hours (4 in the morning and 4 at night) for 5 days. Don't chew or break capsules. Swallow whole.  When to take  Take as soon as possible after positive COVID-19 test result, and within 5 days of your first symptoms.  Who can take it  Patients must be 18 years or older and have tested positive for COVID-19.  Possible side effects  Diarrhea (loose, watery stools), nausea (feeling sick to your stomach), dizziness, headaches.  Medicine conflicts  Right now, there are no known conflicts with other drugs. But tell your care team about all medicines you take.  Cautions  This medicine is not advised for patients who are pregnant.  If you are someone who could become pregnant, use trusted birth control until 4 days after your last dose of molnupiravir.  If your partner could become pregnant, you should use trusted birth control until 3 months after your last dose of molnupiravir.  For informational purposes only. Not  to replace the advice of your health care provider. Copyright   2022 [facility]. All rights reserved. Clinically reviewed by Jacquelyn Palomino, PharmD, BCACP. [employer] 781991 - REV 12/22.    Instructions for Patients      What are the symptoms of COVID-19?  Symptoms can include fever, cough, shortness of breath, chills, headache, muscle pain sore throat, fatigue, runny or stuffy nose, and loss of taste and smell. Other less common symptoms include nausea, vomiting, or diarrhea (watery stools).    Know when to call 911. Emergency warning signs include:  Trouble breathing or shortness of breath  Pain or pressure in the chest that doesn't go away  Feeling confused like you haven't felt before, or not being able to wake up  Bluish-colored lips or face    How can I take care of myself?  Get lots of rest. Drink extra fluids (unless a doctor has told you not to).  Take Tylenol (acetaminophen) for fever or pain. If you have liver or kidney problems, ask your family doctor if it's okay to take Tylenol   Adults can take either:   650 mg (two 325 mg pills or tablets) every 4 to 6 hours, or...   1,000 mg (two 500 mg pills) every 8 hours as needed.  Note: Don't take more than 3,000 mg in one day. Acetaminophen is found in many medicines (both prescribed and over the counter). Read all labels to be sure you don't take too much.  For children, check the Tylenol bottle for the right dose. The dose is based on the child's age or weight.  Take over the counter medicines for your symptoms as needed. Talk to your pharmacist.  If you have other health problems (like cancer, heart failure, an organ transplant, or severe kidney disease): Call your specialty clinic if you don't feel better in the next 2 days.    Where can I get more information?  [facility] COVID-19 Resource Hub: www.[employer].org/covid19/   CDC Quarantine & Isolation: https://www.cdc.gov/coronavirus/2019-ncov/your-health/quarantine-isolation.html    CDC - What to Do If You're Sick: https://www.cdc.gov/coronavirus/2019-ncov/if-you-are-sick/index.html  Learn about the ACTIV-6 Clinical Trial: activ6.[facility] or call (648)-486-0042  Coping with Life After COVID-19  Being in the hospital because of COVID-19 is scary. Going home can be scary, too. You may face changes to your life, the way you work or what you can eat. It s hard to adjust to change, and it s normal to feel afraid, frustrated or even angry. These feelings usually go away over time. If your feelings don t start to get better, it s called  adjustment disorder.      What signs should I look out for?  Adjustment disorder can happen to anyone in a time of stress. It makes it hard to cope with daily life. You may feel lonely or fight with loved ones, even if you re glad to be home. Watch for these signs:  Fear or worry  Hard time focusing  Sadness or anger  Trouble talking to family or friends  Feeling like you don t fit in or isolating yourself  Problems with sleep   Drinking alcohol or taking drugs to cope    What can I do?  You can help yourself get better. Feeling you have control helps you move forward. You may wonder if you ll be able to do things you did before. Be patient. Do your best to make the most of every day. Try to build relationships, be as active as you can, eat right and keep a sense of humor. Avoid smoking and drinking too much alcohol. Call your family doctor or clinic if you re not sure what to do. They can guide you to care or other services.    When should I get help?  Think about getting counseling if your sadness or frustration gets worse. Together with a trained counselor, you can talk about your problems adjusting to life after your hospital stay. You can come up with new ways to handle changes that give you more control. Your family doctor or care team can help you find a counselor.     Get help if you re thinking about hurting yourself. If you need help right away, call 561 or  go to the nearest emergency room. You can also try the Crisis Text Line.    Crisis Text Line: 293-330 (http://www.crisistextline.org)  The Crisis Text Line serves anyone, in any crisis. It gives free, 24/7 support. Here's how it works:  Text HOME to 497396 from anywhere in the [place], anytime, about any type of crisis.  A live, trained Crisis Counselor will text you back quickly.  The volunteer Crisis Counselor can help you move from a  hot moment  to a  cool moment.  They can also help you work out a safety plan.

## 2023-03-31 ENCOUNTER — ANCILLARY PROCEDURE (OUTPATIENT)
Dept: MAMMOGRAPHY | Facility: CLINIC | Age: 63
End: 2023-03-31
Attending: PEDIATRICS
Payer: COMMERCIAL

## 2023-03-31 DIAGNOSIS — Z12.31 VISIT FOR SCREENING MAMMOGRAM: ICD-10-CM

## 2023-03-31 PROCEDURE — 77067 SCR MAMMO BI INCL CAD: CPT | Mod: TC | Performed by: RADIOLOGY

## 2023-05-11 ENCOUNTER — THERAPY VISIT (OUTPATIENT)
Dept: PHYSICAL THERAPY | Facility: CLINIC | Age: 63
End: 2023-05-11
Attending: PEDIATRICS
Payer: COMMERCIAL

## 2023-05-11 DIAGNOSIS — M54.2 CERVICAL PAIN: Primary | ICD-10-CM

## 2023-05-11 DIAGNOSIS — M54.9 UPPER BACK PAIN ON RIGHT SIDE: ICD-10-CM

## 2023-05-11 PROCEDURE — 97110 THERAPEUTIC EXERCISES: CPT | Mod: GP | Performed by: PHYSICAL THERAPIST

## 2023-05-11 PROCEDURE — 97161 PT EVAL LOW COMPLEX 20 MIN: CPT | Mod: GP | Performed by: PHYSICAL THERAPIST

## 2023-05-11 NOTE — PROGRESS NOTES
Physical Therapy Initial Evaluation  Subjective:  The history is provided by the patient. No  was used.   Patient Health History  Sanjuanita Saul being seen for neck pain.     Problem began: 6/1/2022 (MD order).   Problem occurred: unknown   Pain is reported as 0/10 (currently, increases) on pain scale.  General health as reported by patient is fair.  Pertinent medical history includes: depression, high blood pressure, history of fractures, overweight and menopausal.   Red flags:  None as reported by patient.  Medical allergies: other.   Surgeries include:  Other. Other surgery history details: appendectomy.    Current medications:  Anti-depressants and high blood pressure medication.    Current occupation is RN.                     Therapist Generated HPI Evaluation  Problem details: R handed patient reports chronic, intermittent R sided neck pain that radiates to the scapula, denies pain down the arm, numbness or tingling. Riding her horse (holding the reigns in front when at a trot - working buck to stabilize) and lifting cause pain to increase, seems worse when fatigued. Pain also has increased while wearing an equestrian vest.    Walks and works with a  for strengthening, 3x/wk.    History of broken clavicle, no ORIF - pain preceded this injury..         Type of problem:  Cervical spine.    This is a chronic condition.  Condition occurred with:  Insidious onset.  Where condition occurred: for unknown reasons.  Patient reports pain:  Cervical right side.  Pain is described as burning and is intermittent.  Pain radiates to:  Shoulder right. Pain is the same all the time.  Since onset symptoms are gradually worsening.  Symptoms are exacerbated by other and lifting (riding horse)  and relieved by other (arnica, massage).      Restrictions due to condition include:  Working in normal job without restrictions.  Barriers include:  None as reported by patient.                         Objective:  Standing Alignment:    Cervical/Thoracic:  Forward head  Shoulder/UE:  Rounded shoulders                  Flexibility/Screens:     Upper Extremity:    Decreased left upper extremity flexibility at:  Pectoralis Major and Pectoralis Minor    Decreased right upper extremity flexibility present at:  Pectoralis Major and Pectoralis Minor    Spine:  Decreased left spine flexibility:  Upper Trap and Levator    Decreased right spine flexibility:  Upper Trap and Levator                  Cervical/Thoracic Evaluation  Arom wnl cervical: Repeated Cervical Retraction: improves ROM.     AROM:  AROM Cervical:    Flexion:          100% R stretch  Extension:       25%  Rotation:         Left: 50%     Right: 65%  Side Bend:      Left:     Right:     Strength: TrA Contraction: fair -, Mid Trap R: +4/5, L; 4/5, Low Trap R: -4/5, L: +3/5    Cervical Myotomes:  normal                        Cervical Palpation:      Tenderness not present at Left:   Upper Trap or Levator  Tenderness present at Right:    Levator  Tenderness not present at Right:      Upper Trap      Spinal Segmental Conclusions:    Level:  Hypo at C2, C3, C4, C5, C6 and C7                                                General     ROS    Assessment/Plan:    Patient is a 62 year old female with cervical complaints.    Patient has the following significant findings with corresponding treatment plan.                Diagnosis 1:  R sided neck pain  Pain -  hot/cold therapy, mechanical traction, manual therapy, education, directional preference exercise and home program  Decreased ROM/flexibility - manual therapy and therapeutic exercise  Decreased joint mobility - manual therapy and therapeutic exercise  Decreased strength - therapeutic exercise and therapeutic activities  Impaired muscle performance - neuro re-education  Decreased function - therapeutic activities  Impaired posture - neuro re-education    Therapy Evaluation Codes:   1) History comprised  of:   Personal factors that impact the plan of care:      Time since onset of symptoms.    Comorbidity factors that impact the plan of care are:      Depression, High blood pressure, Menopausal, Overweight and history of fracture.     Medications impacting care: Anti-depressant and High blood pressure.  2) Examination of Body Systems comprised of:   Body structures and functions that impact the plan of care:      Cervical spine.   Activity limitations that impact the plan of care are:      Lifting, Sports and horse riding.  3) Clinical presentation characteristics are:   Stable/Uncomplicated.  4) Decision-Making    Low complexity using standardized patient assessment instrument and/or measureable assessment of functional outcome.  Cumulative Therapy Evaluation is: Low complexity.    Previous and current functional limitations:  (See Goal Flow Sheet for this information)    Short term and Long term goals: (See Goal Flow Sheet for this information)     Communication ability:  Patient appears to be able to clearly communicate and understand verbal and written communication and follow directions correctly.  Treatment Explanation - The following has been discussed with the patient:   RX ordered/plan of care  Anticipated outcomes  Possible risks and side effects  This patient would benefit from PT intervention to resume normal activities.   Rehab potential is good.    Frequency:  2 X a month, once daily  Duration:  for 12 weeks  Discharge Plan:  Achieve all LTG.  Independent in home treatment program.  Reach maximal therapeutic benefit.    Please refer to the daily flowsheet for treatment today, total treatment time and time spent performing 1:1 timed codes.

## 2023-06-04 SDOH — HEALTH STABILITY: PHYSICAL HEALTH: ON AVERAGE, HOW MANY MINUTES DO YOU ENGAGE IN EXERCISE AT THIS LEVEL?: 30 MIN

## 2023-06-04 SDOH — ECONOMIC STABILITY: FOOD INSECURITY: WITHIN THE PAST 12 MONTHS, YOU WORRIED THAT YOUR FOOD WOULD RUN OUT BEFORE YOU GOT MONEY TO BUY MORE.: NEVER TRUE

## 2023-06-04 SDOH — ECONOMIC STABILITY: INCOME INSECURITY: HOW HARD IS IT FOR YOU TO PAY FOR THE VERY BASICS LIKE FOOD, HOUSING, MEDICAL CARE, AND HEATING?: NOT HARD AT ALL

## 2023-06-04 SDOH — ECONOMIC STABILITY: INCOME INSECURITY: IN THE LAST 12 MONTHS, WAS THERE A TIME WHEN YOU WERE NOT ABLE TO PAY THE MORTGAGE OR RENT ON TIME?: NO

## 2023-06-04 SDOH — HEALTH STABILITY: PHYSICAL HEALTH: ON AVERAGE, HOW MANY DAYS PER WEEK DO YOU ENGAGE IN MODERATE TO STRENUOUS EXERCISE (LIKE A BRISK WALK)?: 5 DAYS

## 2023-06-04 SDOH — ECONOMIC STABILITY: FOOD INSECURITY: WITHIN THE PAST 12 MONTHS, THE FOOD YOU BOUGHT JUST DIDN'T LAST AND YOU DIDN'T HAVE MONEY TO GET MORE.: NEVER TRUE

## 2023-06-04 ASSESSMENT — ENCOUNTER SYMPTOMS
HEMATOCHEZIA: 0
DYSURIA: 0
CONSTIPATION: 1
FEVER: 0
ARTHRALGIAS: 0
HEMATURIA: 0
SHORTNESS OF BREATH: 0
PALPITATIONS: 0
PARESTHESIAS: 0
HEARTBURN: 0
WEAKNESS: 0
HEADACHES: 0
ABDOMINAL PAIN: 0
BREAST MASS: 0
CHILLS: 0
DIZZINESS: 0
FREQUENCY: 0
MYALGIAS: 0
SORE THROAT: 0
EYE PAIN: 0
NERVOUS/ANXIOUS: 0
DIARRHEA: 0
COUGH: 0
JOINT SWELLING: 0
NAUSEA: 0

## 2023-06-04 ASSESSMENT — LIFESTYLE VARIABLES
HOW OFTEN DO YOU HAVE A DRINK CONTAINING ALCOHOL: NEVER
HOW OFTEN DO YOU HAVE SIX OR MORE DRINKS ON ONE OCCASION: NEVER
HOW MANY STANDARD DRINKS CONTAINING ALCOHOL DO YOU HAVE ON A TYPICAL DAY: PATIENT DOES NOT DRINK
SKIP TO QUESTIONS 9-10: 1
AUDIT-C TOTAL SCORE: 0

## 2023-06-04 ASSESSMENT — SOCIAL DETERMINANTS OF HEALTH (SDOH)
IN A TYPICAL WEEK, HOW MANY TIMES DO YOU TALK ON THE PHONE WITH FAMILY, FRIENDS, OR NEIGHBORS?: NEVER
HOW OFTEN DO YOU GET TOGETHER WITH FRIENDS OR RELATIVES?: ONCE A WEEK
DO YOU BELONG TO ANY CLUBS OR ORGANIZATIONS SUCH AS CHURCH GROUPS UNIONS, FRATERNAL OR ATHLETIC GROUPS, OR SCHOOL GROUPS?: NO
ARE YOU MARRIED, WIDOWED, DIVORCED, SEPARATED, NEVER MARRIED, OR LIVING WITH A PARTNER?: NEVER MARRIED
HOW OFTEN DO YOU ATTEND CHURCH OR RELIGIOUS SERVICES?: NEVER

## 2023-06-05 ENCOUNTER — OFFICE VISIT (OUTPATIENT)
Dept: PEDIATRICS | Facility: CLINIC | Age: 63
End: 2023-06-05
Payer: COMMERCIAL

## 2023-06-05 VITALS
BODY MASS INDEX: 30.12 KG/M2 | RESPIRATION RATE: 16 BRPM | WEIGHT: 170 LBS | TEMPERATURE: 98.2 F | HEART RATE: 82 BPM | OXYGEN SATURATION: 96 % | SYSTOLIC BLOOD PRESSURE: 118 MMHG | DIASTOLIC BLOOD PRESSURE: 60 MMHG | HEIGHT: 63 IN

## 2023-06-05 DIAGNOSIS — I10 ESSENTIAL HYPERTENSION: ICD-10-CM

## 2023-06-05 DIAGNOSIS — Z00.00 ROUTINE GENERAL MEDICAL EXAMINATION AT A HEALTH CARE FACILITY: Primary | ICD-10-CM

## 2023-06-05 DIAGNOSIS — F41.9 ANXIETY: ICD-10-CM

## 2023-06-05 DIAGNOSIS — F33.1 MAJOR DEPRESSIVE DISORDER, RECURRENT EPISODE, MODERATE (H): ICD-10-CM

## 2023-06-05 PROCEDURE — 99396 PREV VISIT EST AGE 40-64: CPT | Performed by: PEDIATRICS

## 2023-06-05 RX ORDER — BUPROPION HYDROCHLORIDE 300 MG/1
300 TABLET ORAL EVERY MORNING
Qty: 90 TABLET | Refills: 3 | Status: SHIPPED | OUTPATIENT
Start: 2023-06-05 | End: 2024-06-20

## 2023-06-05 RX ORDER — AMLODIPINE BESYLATE 5 MG/1
5 TABLET ORAL DAILY
Qty: 90 TABLET | Refills: 3 | Status: SHIPPED | OUTPATIENT
Start: 2023-06-05 | End: 2023-10-17

## 2023-06-05 RX ORDER — ESCITALOPRAM OXALATE 20 MG/1
20 TABLET ORAL DAILY
Qty: 90 TABLET | Refills: 3 | Status: SHIPPED | OUTPATIENT
Start: 2023-06-05 | End: 2024-06-20

## 2023-06-05 RX ORDER — BUPROPION HYDROCHLORIDE 150 MG/1
150 TABLET ORAL EVERY MORNING
Qty: 90 TABLET | Refills: 3 | Status: SHIPPED | OUTPATIENT
Start: 2023-06-05 | End: 2024-06-20

## 2023-06-05 ASSESSMENT — ENCOUNTER SYMPTOMS
MYALGIAS: 0
ABDOMINAL PAIN: 0
EYE PAIN: 0
DIZZINESS: 0
FREQUENCY: 0
WEAKNESS: 0
FEVER: 0
DYSURIA: 0
JOINT SWELLING: 0
ARTHRALGIAS: 0
NAUSEA: 0
CHILLS: 0
SORE THROAT: 0
SHORTNESS OF BREATH: 0
PARESTHESIAS: 0
CONSTIPATION: 1
HEARTBURN: 0
NERVOUS/ANXIOUS: 0
DIARRHEA: 0
HEADACHES: 0
COUGH: 0
HEMATURIA: 0
BREAST MASS: 0
PALPITATIONS: 0
HEMATOCHEZIA: 0

## 2023-06-05 ASSESSMENT — PAIN SCALES - GENERAL: PAINLEVEL: NO PAIN (0)

## 2023-06-05 NOTE — PROGRESS NOTES
SUBJECTIVE:   CC: Sanjuanita is an 62 year old who presents for preventive health visit.       2023     9:47 AM   Additional Questions   Roomed by Cara HUMPHREYS   Accompanied by Self         2023     9:47 AM   Patient Reported Additional Medications   Patient reports taking the following new medications n/a     Healthy Habits:    Getting at least 3 servings of Calcium per day:  Yes    Bi-annual eye exam:  Yes    Dental care twice a year:  Yes    Sleep apnea or symptoms of sleep apnea:  None    Diet:  Regular (no restrictions)    Frequency of exercise:  4-5 days/week    Duration of exercise:  30-45 minutes    Taking medications regularly:  Yes    Medication side effects:  Not applicable    PHQ-2 Total Score:    Additional concerns today:  No                Social History     Tobacco Use     Smoking status: Never     Smokeless tobacco: Never   Vaping Use     Vaping status: Never Used   Substance Use Topics     Alcohol use: No             2023    11:35 PM   Alcohol Use   Prescreen: >3 drinks/day or >7 drinks/week? Not Applicable     Reviewed orders with patient.  Reviewed health maintenance and updated orders accordingly - Yes  Lab work is in process  Labs reviewed in EPIC  BP Readings from Last 3 Encounters:   23 118/60   22 114/77   22 92/62    Wt Readings from Last 3 Encounters:   23 77.1 kg (170 lb)   22 77.2 kg (170 lb 4.8 oz)   21 85.3 kg (188 lb)                    Breast Cancer Screenin/1/2022     7:02 AM 2023    11:39 PM   Breast CA Risk Assessment (FHS-7)   Do you have a family history of breast, colon, or ovarian cancer? Yes No / Unknown         Mammogram Screening: Recommended mammography every 1-2 years with patient discussion and risk factor consideration  Pertinent mammograms are reviewed under the imaging tab.    History of abnormal Pap smear: no longer getting pap smears      Reviewed and updated as needed this visit by clinical staff   Tobacco  " Allergies  Meds              Reviewed and updated as needed this visit by Provider                   Depression/anxiety - well controlled on current medications.   Some constipation related to her prescription medications - manages with miralax and diet.    HTN - well controlled on current medications - no new cardiac symptoms.     [employer] [place] last [place] - wonderful trip.    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       Review of Systems   Constitutional: Negative for chills and fever.   HENT: Positive for hearing loss. Negative for congestion, ear pain and sore throat.    Eyes: Negative for pain and visual disturbance.   Respiratory: Negative for cough and shortness of breath.    Cardiovascular: Negative for chest pain, palpitations and peripheral edema.   Gastrointestinal: Positive for constipation. Negative for abdominal pain, diarrhea, heartburn, hematochezia and nausea.   Breasts:  Negative for tenderness, breast mass and discharge.   Genitourinary: Negative for dysuria, frequency, genital sores, hematuria, pelvic pain, urgency, vaginal bleeding and vaginal discharge.   Musculoskeletal: Negative for arthralgias, joint swelling and myalgias.   Skin: Negative for rash.   Neurological: Negative for dizziness, weakness, headaches and paresthesias.   Psychiatric/Behavioral: Negative for mood changes. The patient is not nervous/anxious.         OBJECTIVE:   /60   Pulse 82   Temp 98.2  F (36.8  C) (Tympanic)   Resp 16   Ht 1.6 m (5' 3\")   Wt 77.1 kg (170 lb)   LMP 01/01/2010   SpO2 96%   BMI 30.11 kg/m     Wt Readings from Last 4 Encounters:   06/05/23 77.1 kg (170 lb) "   06/01/22 77.2 kg (170 lb 4.8 oz)   02/24/21 85.3 kg (188 lb)   04/17/19 88.3 kg (194 lb 11.2 oz)       Physical Exam  GENERAL: healthy, alert and no distress  EYES: Eyes grossly normal to inspection, PERRL and conjunctivae and sclerae normal  HENT: ear canals and TM's normal, nose and mouth without ulcers or lesions  NECK: no adenopathy, no asymmetry, masses, or scars and thyroid normal to palpation  RESP: lungs clear to auscultation - no rales, rhonchi or wheezes  CV: regular rate and rhythm, normal S1 S2, no S3 or S4, no murmur, click or rub, no peripheral edema and peripheral pulses strong  ABDOMEN: soft, nontender, no hepatosplenomegaly, no masses and bowel sounds normal  MS: no gross musculoskeletal defects noted, no edema  SKIN: scrapes on nose and upper lip with small amount of bleeding  NEURO: Normal strength and tone, mentation intact and speech normal  PSYCH: mentation appears normal, affect normal/bright    Diagnostic Test Results:  pending    ASSESSMENT/PLAN:       ICD-10-CM    1. Routine general medical examination at a health care facility  Z00.00 Comprehensive metabolic panel (BMP + Alb, Alk Phos, ALT, AST, Total. Bili, TP)     Lipid panel reflex to direct LDL Fasting     TSH with free T4 reflex     Albumin Random Urine Quantitative with Creat Ratio          UTD on cancer screenings and immunizations                2. Essential hypertension  I10 amLODIPine (NORVASC) 5 MG tablet     Comprehensive metabolic panel (BMP + Alb, Alk Phos, ALT, AST, Total. Bili, TP)     Lipid panel reflex to direct LDL Fasting     TSH with free T4 reflex     Albumin Random Urine Quantitative with Creat Ratio     .cdwel                  3. Major depressive disorder, recurrent episode, moderate (H)  F33.1 escitalopram (LEXAPRO) 20 MG tablet     buPROPion (WELLBUTRIN XL) 300 MG 24 hr tablet     buPROPion (WELLBUTRIN XL) 150 MG 24 hr tablet    Well controlled, continue current medications        4. Anxiety  F41.9 escitalopram  (LEXAPRO) 20 MG tablet    Well controlled, continue current medications                COUNSELING:  Reviewed preventive health counseling, as reflected in patient instructions        She reports that she has never smoked. She has never used smokeless tobacco.      Bernice Calderon MD  [facility]

## 2023-06-17 ENCOUNTER — HOSPITAL ENCOUNTER (OUTPATIENT)
Facility: CLINIC | Age: 63
Setting detail: OBSERVATION
Discharge: HOME | End: 2023-06-18
Attending: EMERGENCY MEDICINE | Admitting: SURGERY
Payer: COMMERCIAL

## 2023-06-17 ENCOUNTER — APPOINTMENT (OUTPATIENT)
Dept: GENERAL RADIOLOGY | Facility: CLINIC | Age: 63
End: 2023-06-17
Attending: EMERGENCY MEDICINE
Payer: COMMERCIAL

## 2023-06-17 ENCOUNTER — APPOINTMENT (OUTPATIENT)
Dept: CT IMAGING | Facility: CLINIC | Age: 63
End: 2023-06-17
Attending: EMERGENCY MEDICINE
Payer: COMMERCIAL

## 2023-06-17 DIAGNOSIS — S22.41XA CLOSED FRACTURE OF MULTIPLE RIBS OF RIGHT SIDE, INITIAL ENCOUNTER: ICD-10-CM

## 2023-06-17 DIAGNOSIS — I62.00 SUBDURAL HEMORRHAGE (H): ICD-10-CM

## 2023-06-17 LAB
ABO/RH(D): NORMAL
ANION GAP SERPL CALCULATED.3IONS-SCNC: 9 MMOL/L (ref 7–15)
ANTIBODY SCREEN: NEGATIVE
APTT PPP: 21 SECONDS (ref 22–38)
BASOPHILS # BLD AUTO: 0 10E3/UL (ref 0–0.2)
BASOPHILS NFR BLD AUTO: 0 %
BUN SERPL-MCNC: 17.1 MG/DL (ref 8–23)
CALCIUM SERPL-MCNC: 9.9 MG/DL (ref 8.8–10.2)
CHLORIDE SERPL-SCNC: 106 MMOL/L (ref 98–107)
CREAT SERPL-MCNC: 1.07 MG/DL (ref 0.51–0.95)
DEPRECATED HCO3 PLAS-SCNC: 25 MMOL/L (ref 22–29)
EOSINOPHIL # BLD AUTO: 0 10E3/UL (ref 0–0.7)
EOSINOPHIL NFR BLD AUTO: 0 %
ERYTHROCYTE [DISTWIDTH] IN BLOOD BY AUTOMATED COUNT: 12.5 % (ref 10–15)
GFR SERPL CREATININE-BSD FRML MDRD: 58 ML/MIN/1.73M2
GLUCOSE SERPL-MCNC: 112 MG/DL (ref 70–99)
HCT VFR BLD AUTO: 38 % (ref 35–47)
HGB BLD-MCNC: 13.3 G/DL (ref 11.7–15.7)
IMM GRANULOCYTES # BLD: 0 10E3/UL
IMM GRANULOCYTES NFR BLD: 0 %
INR PPP: 1.15 (ref 0.85–1.15)
LYMPHOCYTES # BLD AUTO: 0.7 10E3/UL (ref 0.8–5.3)
LYMPHOCYTES NFR BLD AUTO: 7 %
MCH RBC QN AUTO: 32.8 PG (ref 26.5–33)
MCHC RBC AUTO-ENTMCNC: 35 G/DL (ref 31.5–36.5)
MCV RBC AUTO: 94 FL (ref 78–100)
MONOCYTES # BLD AUTO: 0.3 10E3/UL (ref 0–1.3)
MONOCYTES NFR BLD AUTO: 3 %
NEUTROPHILS # BLD AUTO: 8.9 10E3/UL (ref 1.6–8.3)
NEUTROPHILS NFR BLD AUTO: 90 %
NRBC # BLD AUTO: 0 10E3/UL
NRBC BLD AUTO-RTO: 0 /100
PLATELET # BLD AUTO: 210 10E3/UL (ref 150–450)
POTASSIUM SERPL-SCNC: 4.1 MMOL/L (ref 3.4–5.3)
RADIOLOGIST FLAGS: ABNORMAL
RBC # BLD AUTO: 4.06 10E6/UL (ref 3.8–5.2)
SODIUM SERPL-SCNC: 140 MMOL/L (ref 136–145)
SPECIMEN EXPIRATION DATE: NORMAL
WBC # BLD AUTO: 10 10E3/UL (ref 4–11)

## 2023-06-17 PROCEDURE — 250N000011 HC RX IP 250 OP 636: Performed by: EMERGENCY MEDICINE

## 2023-06-17 PROCEDURE — 96374 THER/PROPH/DIAG INJ IV PUSH: CPT

## 2023-06-17 PROCEDURE — 36415 COLL VENOUS BLD VENIPUNCTURE: CPT | Performed by: EMERGENCY MEDICINE

## 2023-06-17 PROCEDURE — 99203 OFFICE O/P NEW LOW 30 MIN: CPT | Performed by: SURGERY

## 2023-06-17 PROCEDURE — 80048 BASIC METABOLIC PNL TOTAL CA: CPT | Performed by: EMERGENCY MEDICINE

## 2023-06-17 PROCEDURE — 99285 EMERGENCY DEPT VISIT HI MDM: CPT | Mod: 25

## 2023-06-17 PROCEDURE — 71046 X-RAY EXAM CHEST 2 VIEWS: CPT

## 2023-06-17 PROCEDURE — 93005 ELECTROCARDIOGRAM TRACING: CPT

## 2023-06-17 PROCEDURE — 85025 COMPLETE CBC W/AUTO DIFF WBC: CPT | Performed by: EMERGENCY MEDICINE

## 2023-06-17 PROCEDURE — G0378 HOSPITAL OBSERVATION PER HR: HCPCS

## 2023-06-17 PROCEDURE — 86901 BLOOD TYPING SEROLOGIC RH(D): CPT | Performed by: EMERGENCY MEDICINE

## 2023-06-17 PROCEDURE — 250N000013 HC RX MED GY IP 250 OP 250 PS 637: Performed by: EMERGENCY MEDICINE

## 2023-06-17 PROCEDURE — 86850 RBC ANTIBODY SCREEN: CPT | Performed by: EMERGENCY MEDICINE

## 2023-06-17 PROCEDURE — 70450 CT HEAD/BRAIN W/O DYE: CPT

## 2023-06-17 PROCEDURE — 85730 THROMBOPLASTIN TIME PARTIAL: CPT | Performed by: EMERGENCY MEDICINE

## 2023-06-17 PROCEDURE — 71250 CT THORAX DX C-: CPT

## 2023-06-17 PROCEDURE — 85610 PROTHROMBIN TIME: CPT | Performed by: EMERGENCY MEDICINE

## 2023-06-17 RX ORDER — OXYCODONE HYDROCHLORIDE 5 MG/1
5 TABLET ORAL EVERY 4 HOURS PRN
Status: DISCONTINUED | OUTPATIENT
Start: 2023-06-17 | End: 2023-06-18 | Stop reason: HOSPADM

## 2023-06-17 RX ORDER — BUPROPION HYDROCHLORIDE 150 MG/1
150 TABLET ORAL EVERY MORNING
Status: DISCONTINUED | OUTPATIENT
Start: 2023-06-18 | End: 2023-06-17

## 2023-06-17 RX ORDER — ONDANSETRON 2 MG/ML
4 INJECTION INTRAMUSCULAR; INTRAVENOUS ONCE
Status: COMPLETED | OUTPATIENT
Start: 2023-06-17 | End: 2023-06-17

## 2023-06-17 RX ORDER — HYDROMORPHONE HCL IN WATER/PF 6 MG/30 ML
0.2 PATIENT CONTROLLED ANALGESIA SYRINGE INTRAVENOUS
Status: DISCONTINUED | OUTPATIENT
Start: 2023-06-17 | End: 2023-06-18 | Stop reason: HOSPADM

## 2023-06-17 RX ORDER — BUPROPION HYDROCHLORIDE 150 MG/1
300 TABLET ORAL EVERY MORNING
Status: DISCONTINUED | OUTPATIENT
Start: 2023-06-18 | End: 2023-06-17

## 2023-06-17 RX ORDER — ACETAMINOPHEN 325 MG/1
975 TABLET ORAL EVERY 8 HOURS
Status: DISCONTINUED | OUTPATIENT
Start: 2023-06-17 | End: 2023-06-18 | Stop reason: HOSPADM

## 2023-06-17 RX ORDER — DEXTROSE MONOHYDRATE, SODIUM CHLORIDE, AND POTASSIUM CHLORIDE 50; 1.49; 4.5 G/1000ML; G/1000ML; G/1000ML
INJECTION, SOLUTION INTRAVENOUS CONTINUOUS
Status: DISCONTINUED | OUTPATIENT
Start: 2023-06-17 | End: 2023-06-18 | Stop reason: HOSPADM

## 2023-06-17 RX ORDER — GABAPENTIN 300 MG/1
300 CAPSULE ORAL 3 TIMES DAILY
Status: DISCONTINUED | OUTPATIENT
Start: 2023-06-17 | End: 2023-06-18 | Stop reason: HOSPADM

## 2023-06-17 RX ORDER — LIDOCAINE 4 G/G
1 PATCH TOPICAL EVERY 24 HOURS
Status: DISCONTINUED | OUTPATIENT
Start: 2023-06-17 | End: 2023-06-18 | Stop reason: HOSPADM

## 2023-06-17 RX ORDER — BUPROPION HYDROCHLORIDE 150 MG/1
450 TABLET ORAL DAILY
Status: DISCONTINUED | OUTPATIENT
Start: 2023-06-18 | End: 2023-06-18 | Stop reason: HOSPADM

## 2023-06-17 RX ORDER — AMLODIPINE BESYLATE 5 MG/1
5 TABLET ORAL EVERY EVENING
Status: DISCONTINUED | OUTPATIENT
Start: 2023-06-17 | End: 2023-06-18 | Stop reason: HOSPADM

## 2023-06-17 RX ORDER — ACETAMINOPHEN 500 MG
1000 TABLET ORAL ONCE
Status: COMPLETED | OUTPATIENT
Start: 2023-06-17 | End: 2023-06-17

## 2023-06-17 RX ORDER — HYDROMORPHONE HCL IN WATER/PF 6 MG/30 ML
0.4 PATIENT CONTROLLED ANALGESIA SYRINGE INTRAVENOUS
Status: DISCONTINUED | OUTPATIENT
Start: 2023-06-17 | End: 2023-06-18 | Stop reason: HOSPADM

## 2023-06-17 RX ORDER — OXYCODONE HYDROCHLORIDE 5 MG/1
10 TABLET ORAL EVERY 4 HOURS PRN
Status: DISCONTINUED | OUTPATIENT
Start: 2023-06-17 | End: 2023-06-18 | Stop reason: HOSPADM

## 2023-06-17 RX ORDER — MORPHINE SULFATE 2 MG/ML
2 INJECTION, SOLUTION INTRAMUSCULAR; INTRAVENOUS ONCE
Status: COMPLETED | OUTPATIENT
Start: 2023-06-17 | End: 2023-06-17

## 2023-06-17 RX ORDER — ESCITALOPRAM OXALATE 20 MG/1
20 TABLET ORAL DAILY
Status: DISCONTINUED | OUTPATIENT
Start: 2023-06-18 | End: 2023-06-18 | Stop reason: HOSPADM

## 2023-06-17 RX ORDER — LIDOCAINE 4 G/G
1 PATCH TOPICAL ONCE
Status: COMPLETED | OUTPATIENT
Start: 2023-06-17 | End: 2023-06-18

## 2023-06-17 RX ORDER — METHOCARBAMOL 500 MG/1
500 TABLET, FILM COATED ORAL EVERY 6 HOURS PRN
Status: DISCONTINUED | OUTPATIENT
Start: 2023-06-17 | End: 2023-06-18 | Stop reason: HOSPADM

## 2023-06-17 RX ADMIN — LIDOCAINE PATCH 4% 1 PATCH: 40 PATCH TOPICAL at 16:45

## 2023-06-17 RX ADMIN — ACETAMINOPHEN 1000 MG: 500 TABLET, FILM COATED ORAL at 15:31

## 2023-06-17 RX ADMIN — ONDANSETRON 4 MG: 2 INJECTION INTRAMUSCULAR; INTRAVENOUS at 15:11

## 2023-06-17 ASSESSMENT — ACTIVITIES OF DAILY LIVING (ADL)
ADLS_ACUITY_SCORE: 35

## 2023-06-17 NOTE — ED PROVIDER NOTES
"  History     Chief Complaint:  Fall       HPI   Sanjuanita Saul is a 62 year old female who presents after fall from horse.    Patient is a 62-year-old female who has no active medical problems does take aspirin but no other blood thinners.  Patient apparently was riding a horse.  That \"took off\".  Typically has been a little bit ornery but was worse today.  Horse began running it would not stop.  Ultimately patient threw herself from the horse that she could not stop the horse.  Patient was wearing a helmet.  She did hit her head.  She describes approximately according to bystanders about 7 minutes of loss of time.  Patient did vomit once.  EMS was called to the scene patient also injured her chest cage has had some pain in the right mid chest felt dizzy when she tried to sit up and EMS was called to transport to the emergency room for assessment.  On arrival patient is awake and alert she denies alcohol use..      Independent Historian:   None - Patient Only    Review of External Notes:          Medications:    amLODIPine (NORVASC) 5 MG tablet  ASPIRIN 325 MG OR TABS  buPROPion (WELLBUTRIN XL) 150 MG 24 hr tablet  buPROPion (WELLBUTRIN XL) 300 MG 24 hr tablet  escitalopram (LEXAPRO) 20 MG tablet  multivitamin w/minerals (THERA-VIT-M) tablet  Omega-3 Fatty Acids (OMEGA-3 FISH OIL PO)        Past Medical History:    Past Medical History:   Diagnosis Date     Allergic rhinitis, cause unspecified      Depressive disorder 2000     Depressive disorder, not elsewhere classified      Essential hypertension 4/17/2019       Past Surgical History:    Past Surgical History:   Procedure Laterality Date     APPENDECTOMY  05/2011     BIOPSY  2011     COLONOSCOPY  2020        Physical Exam     Patient Vitals for the past 24 hrs:   BP Pulse Resp SpO2 Height Weight   06/17/23 1425 138/72 80 -- 100 % -- --   06/17/23 1254 -- -- -- -- 1.626 m (5' 4\") 77.1 kg (170 lb)   06/17/23 1240 -- 78 20 99 % -- --        Physical Exam  Vitals " reviewed.   HENT:      Head: Normocephalic.      Right Ear: Tympanic membrane normal.      Left Ear: Tympanic membrane normal.      Nose: Nose normal.      Mouth/Throat:      Mouth: Mucous membranes are moist.   Eyes:      Pupils: Pupils are equal, round, and reactive to light.   Neck:      Comments: C-collar was removed no midline tenderness no pain with flexion extension or rotation cleared clinically and c-collar was discontinued.  Cardiovascular:      Rate and Rhythm: Normal rate and regular rhythm.   Pulmonary:      Effort: Pulmonary effort is normal.      Breath sounds: Normal breath sounds.   Abdominal:      General: Abdomen is flat. Bowel sounds are normal.   Musculoskeletal:         General: Normal range of motion.      Cervical back: Normal range of motion. No tenderness.   Skin:     General: Skin is warm.      Capillary Refill: Capillary refill takes less than 2 seconds.   Neurological:      General: No focal deficit present.      Mental Status: She is alert and oriented to person, place, and time.   Psychiatric:         Mood and Affect: Mood normal.           Emergency Department Course   ECG  ECG taken at 1539, ECG read at 1600    Rate 79 bpm. LA interval 150 ms. QRS duration 88 ms. QT/QTc 334/382 ms.     Imaging:  Chest CT w/o contrast   Final Result   IMPRESSION:    1.  Nondisplaced fractures of the right second through sixth ribs anteriorly   2.  Incidental circumferential wall thickening of the lower esophagus is typically due to esophagitis from gastroesophageal reflux, but correlation with any reflux history is requested, as esophageal cancer can have the same appearance at CT.         Head CT w/o contrast   Final Result   Abnormal   IMPRESSION:   1.  Very small volume extra-axial subdural hemorrhage overlies the tentorium cerebelli on the right and possibly on the left laterally. No mass effect noted.       2.  No additional hemorrhage is present.      3.  Nothing for parenchymal contusion or  acute/evolving infarction.      4.  Less significant details and full description are given above.      5.  No fractures.         [Critical Result: Small volume hyperdense hemorrhage, subdural in location.]            1.  Referring ER clinician Dr. Prakash Black was contacted by me on 0617/2023 at 3:18 PM CDT and verbalized understanding of the critical result.             Chest XR,  PA & LAT   Final Result   IMPRESSION: Negative chest.         Report per radiology    Laboratory:  Labs Ordered and Resulted from Time of ED Arrival to Time of ED Departure   PARTIAL THROMBOPLASTIN TIME - Abnormal       Result Value    aPTT 21 (*)    BASIC METABOLIC PANEL - Abnormal    Sodium 140      Potassium 4.1      Chloride 106      Carbon Dioxide (CO2) 25      Anion Gap 9      Urea Nitrogen 17.1      Creatinine 1.07 (*)     Calcium 9.9      Glucose 112 (*)     GFR Estimate 58 (*)    CBC WITH PLATELETS AND DIFFERENTIAL - Abnormal    WBC Count 10.0      RBC Count 4.06      Hemoglobin 13.3      Hematocrit 38.0      MCV 94      MCH 32.8      MCHC 35.0      RDW 12.5      Platelet Count 210      % Neutrophils 90      % Lymphocytes 7      % Monocytes 3      % Eosinophils 0      % Basophils 0      % Immature Granulocytes 0      NRBCs per 100 WBC 0      Absolute Neutrophils 8.9 (*)     Absolute Lymphocytes 0.7 (*)     Absolute Monocytes 0.3      Absolute Eosinophils 0.0      Absolute Basophils 0.0      Absolute Immature Granulocytes 0.0      Absolute NRBCs 0.0     INR - Normal    INR 1.15     TYPE AND SCREEN, ADULT    SPECIMEN EXPIRATION DATE [id number]     ABO/RH TYPE AND SCREEN          Emergency Department Course & Assessments:             Interventions:  Medications - No data to display     Assessments:      Independent Interpretation (X-rays, CTs, rhythm strip):  None    Consultations/Discussion of Management or Tests:  Dr. Antonio Burt          Social Determinants of Health affecting care:   None    Disposition:  The patient  was discharged to home.     Impression & Plan        Medical Decision Making:  Patient is a 62-year-old female who presents after closed head injury and fall from horse.  Patient's vitals were stable.  Ultimately imaging of the head was performed due to loss of consciousness could not clear using [place] head CT rules.  Imaging does identify a small falx subdural.  Patient is not anticoagulated blood pressure is normal.  C-spine was cleared clinically using Nexus.  Chest imaging did not as far as chest x-ray did not identify pathology high clinical suspicions for rib fractures based on localized pain worse with respiration and with motion.  Ultimately noncontrast CT was performed at her age and did identify rib fractures 2 through 6.  Without complication.  Ultimately due to multiple areas of trauma care was discussed with Dr. Burt from trauma surgery and was admitted on observation due to need for serial head CTs small nonoperable subdural hematoma and multiple rib fractures.        Diagnosis:    ICD-10-CM    1. Subdural hemorrhage (H)  I62.00       2. Closed fracture of multiple ribs of right side, initial encounter  S22.41XA            Discharge Medications:  New Prescriptions    No medications on file          Prakash Black MD  6/17/2023   Prakash Black MD Goodman, Brian Samuel, MD  06/17/23 1701

## 2023-06-17 NOTE — H&P
Chief complaint:  Fall from horse    HPI:  This patient is a 62 year old female who presents after falling from a horse.  The patient reportedly had a loss of consciousness at the scene, having hit her head.  She was wearing a helmet.  She complains of right-sided chest pain and head pain.  She denies any other areas of discomfort.  She presented to the emergency room and a CT scan of the head revealed a small subdural hematoma.  Subsequent chest CT revealed multiple right-sided rib fractures.  I was asked to admit for the trauma service pending neurosurgery consultation.    Past Medical History:   has a past medical history of Allergic rhinitis, cause unspecified, Depressive disorder (2000), Depressive disorder, not elsewhere classified, and Essential hypertension (4/17/2019).  GERD    Past Surgical History:  Past Surgical History:   Procedure Laterality Date     APPENDECTOMY  05/2011     BIOPSY  2011     COLONOSCOPY  2020        Social History:  Social History     Socioeconomic History     Marital status: Single     Spouse name: Not on file     Number of children: Not on file     Years of education: Not on file     Highest education level: Not on file   Occupational History     Occupation: [employer]     Occupation: Nursing student   Tobacco Use     Smoking status: Never     Smokeless tobacco: Never   Vaping Use     Vaping status: Never Used   Substance and Sexual Activity     Alcohol use: No     Drug use: No     Sexual activity: Never     Comment: never sexually active   Other Topics Concern     Parent/sibling w/ CABG, MI or angioplasty before 65F 55M? No   Social History Narrative    previous pap screens normal, last pap screen at physical not completed due to patient discomfort     Social Determinants of Health     Financial Resource Strain: Low Risk  (6/4/2023)    Overall Financial Resource Strain (CARDIA)      Difficulty of Paying Living Expenses: Not hard at all   Food Insecurity: No Food Insecurity  (2023)    Hunger Vital Sign      Worried About Running Out of Food in the Last Year: Never true      Ran Out of Food in the Last Year: Never true   Transportation Needs: No Transportation Needs (2023)    PRAPARE - Transportation      Lack of Transportation (Medical): No      Lack of Transportation (Non-Medical): No   Physical Activity: Sufficiently Active (2023)    Exercise Vital Sign      Days of Exercise per Week: 5 days      Minutes of Exercise per Session: 30 min   Stress: No Stress Concern Present (2023)    [employer] [place] [employer] - Occupational Stress Questionnaire      Feeling of Stress : Not at all   Social Connections: Socially Isolated (2023)    Social Connection and Isolation Panel [NHANES]      Frequency of Communication with Friends and Family: Never      Frequency of Social Gatherings with Friends and Family: Once a week      Attends Catholic Services: Never      Active Member of Clubs or Organizations: No      Attends Club or Organization Meetings: Not on file      Marital Status: Never    Intimate Partner Violence: Not on file   Housing Stability: Low Risk  (2023)    Housing Stability Vital Sign      Unable to Pay for Housing in the Last Year: No      Number of Places Lived in the Last Year: 1      Unstable Housing in the Last Year: No        Family History:  Family History   Problem Relation Age of Onset     Cancer Father         Lung Cancer.  age 62     Breast Cancer Sister      C.A.D. Maternal Grandmother         MI age 92     Diabetes Maternal Grandmother      Cerebrovascular Disease Maternal Grandfather         age 82     Osteoporosis Paternal Grandmother          in her 80's     Breast Cancer Maternal Aunt      Cancer - colorectal Paternal Aunt      Breast Cancer Other      Mental Illness Brother         Maybe autism       Review of Systems:  The 10 point Review of Systems is negative other than noted in the HPI and above.    Physical  Exam:  General - This is a well developed, well nourished female in obvious discomfort.  HEENT - Normocephalic, atraumatic.  No scleral icterus.  Pupils are equal round and reactive to light.  External ocular motion is intact.  Neck - supple without masses  Lungs - clear to ascultation.    Chest reveals tenderness of the right chest wall anteriorly and laterally.  Heart - Regular rate & rhythm without murmur  Abdomen: Soft and nontender.  Spine reveals no point tenderness.  Extremities - warm without edema.  No obvious trauma.  Neurologic - nonfocal.  Glascow coma score is 15.  Moves all extremities symmetrically.    Relevant labs:  White blood cell count is 10,000.  Hemoglobin is 13.3.    Imaging:  CT scan of the head reveals a very small volume extra-axial subdural hemorrhage overlying the tentorium cerebelli on the right and possibly on the left.  There is no mass effect.  There are no fractures visualized.  CT scan of the chest reveals nondisplaced fractures of the right second through sixth ribs anteriorly.  There is a possible fracture laterally as well.    Assessment and Plan:  This is a patient with a tiny subdural hematoma and multiple right-sided rib fractures after a fall from a horse.  The patient did have a several minute loss of consciousness.  The neurosurgery team is aware and has reviewed the films.  They have requested a follow-up CT scan tomorrow morning.  I have placed rib fracture orders.  I will place a formal consult for neurosurgery and for the hospitalist service to assist with medication management.  Should the patient not be ready for discharge tomorrow, I would anticipate transfer to the medicine service.      Antonio Burt MD  [facility]

## 2023-06-17 NOTE — ED NOTES
[facility]  ED Nurse Handoff Report    ED Chief complaint: Fall  . ED Diagnosis:   Final diagnoses:   Subdural hemorrhage (H)   Closed fracture of multiple ribs of right side, initial encounter       Allergies:   Allergies   Allergen Reactions     Chlorhexidine Gluconate        Code Status: Full Code    Activity level - Baseline/Home:  independent.  Activity Level - Current:   in bed.   Lift room needed: No.   Bariatric: No   Needed: No   Isolation: No.   Infection: Not Applicable.     Respiratory status: Room air    Vital Signs (within 30 minutes):   Vitals:    06/17/23 1440 06/17/23 1530 06/17/23 1545 06/17/23 1646   BP:  121/64     Pulse:  87     Resp:       Temp:    97.9  F (36.6  C)   TempSrc:    Oral   SpO2: 100% 99% 100%    Weight:       Height:           Cardiac Rhythm:  ,      Pain level:    Patient confused: No.   Patient Falls Risk: nonskid shoes/slippers when out of bed, arm band in place, patient and family education and assistive device/personal items within reach.   Elimination Status: Unable to void     Patient Report - Initial Complaint: Fall from horse.  Focused Assessment: Pt was riding her horse, she couldn't control his speed and pt jumped off. Hit back of her head, she was wearing a helmet. Per bystanders pt had a brief of LOC. Pt had an emesis when she became more alert. Pt c/o pain in right ribs.      Abnormal Results:   Labs Ordered and Resulted from Time of ED Arrival to Time of ED Departure   PARTIAL THROMBOPLASTIN TIME - Abnormal       Result Value    aPTT 21 (*)    BASIC METABOLIC PANEL - Abnormal    Sodium 140      Potassium 4.1      Chloride 106      Carbon Dioxide (CO2) 25      Anion Gap 9      Urea Nitrogen 17.1      Creatinine 1.07 (*)     Calcium 9.9      Glucose 112 (*)     GFR Estimate 58 (*)    CBC WITH PLATELETS AND DIFFERENTIAL - Abnormal    WBC Count 10.0      RBC Count 4.06      Hemoglobin 13.3      Hematocrit 38.0      MCV 94      MCH 32.8       MCHC 35.0      RDW 12.5      Platelet Count 210      % Neutrophils 90      % Lymphocytes 7      % Monocytes 3      % Eosinophils 0      % Basophils 0      % Immature Granulocytes 0      NRBCs per 100 WBC 0      Absolute Neutrophils 8.9 (*)     Absolute Lymphocytes 0.7 (*)     Absolute Monocytes 0.3      Absolute Eosinophils 0.0      Absolute Basophils 0.0      Absolute Immature Granulocytes 0.0      Absolute NRBCs 0.0     INR - Normal    INR 1.15     TYPE AND SCREEN, ADULT    SPECIMEN EXPIRATION DATE [id number]     ABO/RH TYPE AND SCREEN        Chest CT w/o contrast   Final Result   IMPRESSION:    1.  Nondisplaced fractures of the right second through sixth ribs anteriorly   2.  Incidental circumferential wall thickening of the lower esophagus is typically due to esophagitis from gastroesophageal reflux, but correlation with any reflux history is requested, as esophageal cancer can have the same appearance at CT.         Head CT w/o contrast   Final Result   Abnormal   IMPRESSION:   1.  Very small volume extra-axial subdural hemorrhage overlies the tentorium cerebelli on the right and possibly on the left laterally. No mass effect noted.       2.  No additional hemorrhage is present.      3.  Nothing for parenchymal contusion or acute/evolving infarction.      4.  Less significant details and full description are given above.      5.  No fractures.         [Critical Result: Small volume hyperdense hemorrhage, subdural in location.]            1.  Referring ER clinician Dr. Prakash Black was contacted by me on 0617/2023 at 3:18 PM CDT and verbalized understanding of the critical result.             Chest XR,  PA & LAT   Final Result   IMPRESSION: Negative chest.          Treatments provided: labs, imaging, pain patch  Family Comments: per patient  OBS brochure/video discussed/provided to patient:  No  ED Medications:   Medications   Lidocaine (LIDOCARE) 4 % Patch 1 patch (1 patch Transdermal $Patch/Med  Applied 6/17/23 1645)   ondansetron (ZOFRAN) injection 4 mg (4 mg Intravenous $Given 6/17/23 1511)   morphine (PF) injection 2 mg (2 mg Intravenous Not Given 6/17/23 1528)   acetaminophen (TYLENOL) tablet 1,000 mg (1,000 mg Oral $Given 6/17/23 1531)       Drips infusing:  No  For the majority of the shift this patient was Green.   Interventions performed were updated on plan of care and process of admission.    Sepsis treatment initiated: No    Cares/treatment/interventions/medications to be completed following ED care: pain control, also see note from neurosurgery team re: follow up imaging and consult    ED Nurse Name: Marie Newton RN  4:53 PM    RECEIVING UNIT ED HANDOFF REVIEW    Above ED Nurse Handoff Report was reviewed: Yes  Reviewed by: Nita Horta RN on June 17, 2023 at 11:51 PM

## 2023-06-17 NOTE — ED TRIAGE NOTES
Pt was riding her horse, she couldn't control his speed and pt jumped off. Hit back of her head, she was wearing a helmet. Per bystanders pt had a brief of LOC. Pt had an emesis when she became more alert. Pt c/o pain in right ribs. C/o feeling sleepy now.     Triage Assessment     Row Name 06/17/23 8077       Triage Assessment (Adult)    Airway WDL WDL       Respiratory WDL    Respiratory WDL WDL       Skin Circulation/Temperature WDL    Skin Circulation/Temperature WDL WDL       Cardiac WDL    Cardiac WDL WDL       Peripheral/Neurovascular WDL    Peripheral Neurovascular WDL WDL       Cognitive/Neuro/Behavioral WDL    Cognitive/Neuro/Behavioral WDL WDL

## 2023-06-17 NOTE — PROGRESS NOTES
Neurosurgery was contacted regarding Ms. Martines, 62 old female who fell off a horse, head CT scan demonstrated    IMPRESSION:  1.  Very small volume extra-axial subdural hemorrhage overlies the tentorium cerebelli on the right and possibly on the left laterally. No mass effect noted.     She is not on any anticoagulation, she is reported to be neurologically intact.  She is being admitted by hospitalist.  Recommend blood pressure less than 150 systolic, follow-up head CT scan tomorrow morning, neurosurgical consult at that time.

## 2023-06-17 NOTE — PHARMACY-ADMISSION MEDICATION HISTORY
Pharmacist Admission Medication History    Admission medication history is complete. The information provided in this note is only as accurate as the sources available at the time of the update.    Medication reconciliation/reorder completed by provider prior to medication history? No    Information Source(s): Patient and CareEverywhere/SureScripts via in-person    Pertinent Information: Patient stated after she took her bupropion this AM, she threw up what looked to be the 150 mg capsule.     Changes made to PTA medication list:    Added: None    Deleted: None    Changed: amlodipine, multivit, and Fish oil from daily --> qPM       Allergies reviewed with patient and updates made in EHR: no    Medication History Completed By: ANDREEA GAVIN [facility] 6/17/2023 3:48 PM    Prior to Admission medications    Medication Sig Last Dose Taking? Auth Provider Long Term End Date   amLODIPine (NORVASC) 5 MG tablet Take 1 tablet (5 mg) by mouth daily  Patient taking differently: Take 5 mg by mouth every evening 6/16/2023 at PM Yes Bernice Calderon MD Yes    ASPIRIN 325 MG OR TABS Take 325-650 mg by mouth every 6 hours as needed for pain Unknown at PRN Yes Reported, Patient     buPROPion (WELLBUTRIN XL) 150 MG 24 hr tablet Take 1 tablet (150 mg) by mouth every morning In combination with 300mg tab for total dose 450mg 6/17/2023 at AM Yes Bernice Calderon MD Yes    buPROPion (WELLBUTRIN XL) 300 MG 24 hr tablet Take 1 tablet (300 mg) by mouth every morning 6/17/2023 at AM Yes Bernice Calderon MD Yes    escitalopram (LEXAPRO) 20 MG tablet Take 1 tablet (20 mg) by mouth daily 6/17/2023 at AM Yes Bernice Calderon MD Yes    multivitamin w/minerals (THERA-VIT-M) tablet Take 1 tablet by mouth every evening 6/16/2023 at PM Yes Reported, Patient     Omega-3 Fatty Acids (OMEGA-3 FISH OIL PO) Take 1 g by mouth every evening 6/16/2023 at PM Yes Reported, Patient

## 2023-06-18 ENCOUNTER — APPOINTMENT (OUTPATIENT)
Dept: CT IMAGING | Facility: CLINIC | Age: 63
End: 2023-06-18
Attending: SURGERY
Payer: COMMERCIAL

## 2023-06-18 ENCOUNTER — APPOINTMENT (OUTPATIENT)
Dept: PHYSICAL THERAPY | Facility: CLINIC | Age: 63
End: 2023-06-18
Attending: SURGERY
Payer: COMMERCIAL

## 2023-06-18 ENCOUNTER — APPOINTMENT (OUTPATIENT)
Dept: GENERAL RADIOLOGY | Facility: CLINIC | Age: 63
End: 2023-06-18
Attending: SURGERY
Payer: COMMERCIAL

## 2023-06-18 VITALS
BODY MASS INDEX: 28.79 KG/M2 | HEIGHT: 64 IN | HEART RATE: 74 BPM | SYSTOLIC BLOOD PRESSURE: 126 MMHG | RESPIRATION RATE: 16 BRPM | DIASTOLIC BLOOD PRESSURE: 69 MMHG | WEIGHT: 168.6 LBS | TEMPERATURE: 97.6 F | OXYGEN SATURATION: 93 %

## 2023-06-18 LAB
ANION GAP SERPL CALCULATED.3IONS-SCNC: 9 MMOL/L (ref 7–15)
BUN SERPL-MCNC: 14 MG/DL (ref 8–23)
CALCIUM SERPL-MCNC: 9.6 MG/DL (ref 8.8–10.2)
CHLORIDE SERPL-SCNC: 107 MMOL/L (ref 98–107)
CREAT SERPL-MCNC: 1.04 MG/DL (ref 0.51–0.95)
DEPRECATED HCO3 PLAS-SCNC: 24 MMOL/L (ref 22–29)
GFR SERPL CREATININE-BSD FRML MDRD: 60 ML/MIN/1.73M2
GLUCOSE SERPL-MCNC: 101 MG/DL (ref 70–99)
MAGNESIUM SERPL-MCNC: 2 MG/DL (ref 1.7–2.3)
PHOSPHATE SERPL-MCNC: 3.3 MG/DL (ref 2.5–4.5)
POTASSIUM SERPL-SCNC: 4.2 MMOL/L (ref 3.4–5.3)
SODIUM SERPL-SCNC: 140 MMOL/L (ref 136–145)

## 2023-06-18 PROCEDURE — 82310 ASSAY OF CALCIUM: CPT | Performed by: SURGERY

## 2023-06-18 PROCEDURE — G0378 HOSPITAL OBSERVATION PER HR: HCPCS

## 2023-06-18 PROCEDURE — 96361 HYDRATE IV INFUSION ADD-ON: CPT

## 2023-06-18 PROCEDURE — 250N000013 HC RX MED GY IP 250 OP 250 PS 637: Performed by: SURGERY

## 2023-06-18 PROCEDURE — 97161 PT EVAL LOW COMPLEX 20 MIN: CPT | Mod: GP | Performed by: PHYSICAL THERAPIST

## 2023-06-18 PROCEDURE — 99207 PR NO BILLABLE SERVICE THIS VISIT: CPT | Performed by: INTERNAL MEDICINE

## 2023-06-18 PROCEDURE — 84100 ASSAY OF PHOSPHORUS: CPT | Performed by: SURGERY

## 2023-06-18 PROCEDURE — 250N000013 HC RX MED GY IP 250 OP 250 PS 637: Performed by: EMERGENCY MEDICINE

## 2023-06-18 PROCEDURE — 36415 COLL VENOUS BLD VENIPUNCTURE: CPT | Performed by: SURGERY

## 2023-06-18 PROCEDURE — 258N000003 HC RX IP 258 OP 636: Performed by: SURGERY

## 2023-06-18 PROCEDURE — 70450 CT HEAD/BRAIN W/O DYE: CPT

## 2023-06-18 PROCEDURE — 71045 X-RAY EXAM CHEST 1 VIEW: CPT

## 2023-06-18 PROCEDURE — 83735 ASSAY OF MAGNESIUM: CPT | Performed by: SURGERY

## 2023-06-18 PROCEDURE — 99203 OFFICE O/P NEW LOW 30 MIN: CPT | Performed by: PHYSICIAN ASSISTANT

## 2023-06-18 RX ORDER — NALOXONE HYDROCHLORIDE 0.4 MG/ML
0.4 INJECTION, SOLUTION INTRAMUSCULAR; INTRAVENOUS; SUBCUTANEOUS
Status: DISCONTINUED | OUTPATIENT
Start: 2023-06-18 | End: 2023-06-18 | Stop reason: HOSPADM

## 2023-06-18 RX ORDER — NALOXONE HYDROCHLORIDE 0.4 MG/ML
0.2 INJECTION, SOLUTION INTRAMUSCULAR; INTRAVENOUS; SUBCUTANEOUS
Status: DISCONTINUED | OUTPATIENT
Start: 2023-06-18 | End: 2023-06-18 | Stop reason: HOSPADM

## 2023-06-18 RX ADMIN — GABAPENTIN 300 MG: 300 CAPSULE ORAL at 08:34

## 2023-06-18 RX ADMIN — GABAPENTIN 300 MG: 300 CAPSULE ORAL at 13:24

## 2023-06-18 RX ADMIN — GABAPENTIN 300 MG: 300 CAPSULE ORAL at 00:18

## 2023-06-18 RX ADMIN — AMLODIPINE BESYLATE 5 MG: 5 TABLET ORAL at 00:17

## 2023-06-18 RX ADMIN — ESCITALOPRAM OXALATE 20 MG: 20 TABLET, FILM COATED ORAL at 08:34

## 2023-06-18 RX ADMIN — ACETAMINOPHEN 975 MG: 325 TABLET, FILM COATED ORAL at 13:24

## 2023-06-18 RX ADMIN — LIDOCAINE 1 PATCH: 560 PATCH PERCUTANEOUS; TOPICAL; TRANSDERMAL at 00:18

## 2023-06-18 RX ADMIN — ACETAMINOPHEN 975 MG: 325 TABLET, FILM COATED ORAL at 00:17

## 2023-06-18 RX ADMIN — POTASSIUM CHLORIDE, DEXTROSE MONOHYDRATE AND SODIUM CHLORIDE: 150; 5; 450 INJECTION, SOLUTION INTRAVENOUS at 00:10

## 2023-06-18 RX ADMIN — ACETAMINOPHEN 975 MG: 325 TABLET, FILM COATED ORAL at 05:02

## 2023-06-18 RX ADMIN — BUPROPION HYDROCHLORIDE 450 MG: 150 TABLET, FILM COATED, EXTENDED RELEASE ORAL at 08:34

## 2023-06-18 ASSESSMENT — ACTIVITIES OF DAILY LIVING (ADL)
ADLS_ACUITY_SCORE: 37
ADLS_ACUITY_SCORE: 39
ADLS_ACUITY_SCORE: 39
ADLS_ACUITY_SCORE: 35
ADLS_ACUITY_SCORE: 39
ADLS_ACUITY_SCORE: 37
ADLS_ACUITY_SCORE: 39
ADLS_ACUITY_SCORE: 35
ADLS_ACUITY_SCORE: 39

## 2023-06-18 NOTE — ED NOTES
Bed: ED29  Expected date:   Expected time:   Means of arrival:   Comments:  Either 34 or 36 AFTER 31 goes upstairs

## 2023-06-18 NOTE — CONSULTS
Neurosurgery Consult    HPI    Ms. Saul is a 62-year-old female who had to dive off of her horse due to sudden and unexpected belligerence from the horse.  She was wearing a helmet.  She had apparently lost consciousness at the scene having hit her head, she was found to have rib fractures as well as a subdural hematoma.  She is not on any blood thinners.  She was admitted for observation with a repeat head CT scan in the morning which was stable.    Medical history  Depressive disorder  Hypertension  GERD    Social history  Enjoys riding horses  Non-smoker    B/P: 108/61, T: 97.6, P: 70, R: 16       Exam    Alert and oriented no acute distress  Moving all extremities  Finger-nose slow and accurate  Negative pronator drift  Extraocular movements intact  Pupils equal and reactive        Imaging    Initial head CT scan demonstrated    IMPRESSION:  1.  Very small volume extra-axial subdural hemorrhage overlies the tentorium cerebelli on the right and possibly on the left laterally. No mass effect noted.     Follow-up head CT scan was stable    Assessment    Status post fall from horse, head injury, loss of consciousness, small tentorial subdural hematoma bilaterally.    Plan:      Recommend the patient follow-up in 1 month with repeat head CT scan in neurosurgery clinic.  Avoid any blood thinners in the interim.  We will arrange this visit with the patient.    Neurosurgery will sign off

## 2023-06-18 NOTE — PLAN OF CARE
End of Shift Summary  For vital signs and complete assessments, please see documentation flowsheets.     Pertinent assessments: Pt alert and oriented x4, Neuro's intact. Regular diet. PIV, IVF stopped. Pain controlled with scheduled tylenol. SBA/indepdent with ambulation.   Major Shift Events Admitted to floor.    Treatment Plan: Plan for repeat CT this AM, pain control.

## 2023-06-18 NOTE — PROGRESS NOTES
"Surgery Progress Note:     S: doing pretty well, no headache, pain controlled, no SOB     O:   /61 (BP Location: Left arm)   Pulse 70   Temp 97.6  F (36.4  C) (Oral)   Resp 16   Ht 1.626 m (5' 4\")   Wt 76.5 kg (168 lb 9.6 oz)   LMP 01/01/2010   SpO2 93%   BMI 28.94 kg/m    Gen: appears well, nad   Resp: normal effort on RA   Cv: regular rate and rhythm   Abd: soft and not distended   Neuro: AOX3, strength intact throughout     F/u head CT stable   CXR unremarkable     A/P:  -possible discharge today vs tomorrow pending pain control for rib fractures   -f/u with NSGY in 1 month with repeat head CT   -regular diet   -rib fracture order set     Danis Nick MD    "

## 2023-06-18 NOTE — CONSULTS
Hospitalist Consultation      Sanjuanita Saul MRN# [id number]   YOB: 1960 Age: 62 year old   Date of Admission: 6/17/2023     Requesting Physician:  Andrea Burt  Reason for consult: Medical management           Assessment and Plan:   Sanjuanita Saul is a 61yo female with a past medical history significant for HTN and anxiety/depression who was admitted 6/17/23 after falling from her horse. Was found to have right anterior rib fractures (2-6) and a very small volume subdural hematoma.  We have been asked to consult for medical management.     Problem List:  #. HTN  Takes Amlodipine 5mg daily usually. BP appears to be well controlled with this at baseline. In light of current SDH, goal is to keep SBP <150.  - continue current dose of amlodipine  - monitor BP closely   - will need PCP follow up within 1 week of discharge    #. Anxiety/Depression  Stable. Continue PTA Wellbutrin and Lexapro.     #. Subdural Hematoma  Neurosurgery following. Repeat head CT today was stable. Plan is to repeat CT in 1 month at [facility] follow up. No blood thinners in the meantime.     #. Right Anterior Rib Fractures  Pain well controlled. Encouraged her to use IS until symptoms totally resolved.              History of Present Illness:   Sanjuanita Saul is a 61yo female with a past medical history significant for HTN and anxiety/depression who was admitted 6/17/23 after falling from her horse. States she was riding her horse, who seemed a bit agitated today. He feet were not totally in the stirrup either. She was worried the horse was going to self her off of him so she had to jump off of the horse. Did have LOC, was wearing a helmet. Was found to have right anterior rib fractures (2-6) and a very small volume subdural hematoma.  We have been asked to consult for medical management. Currently feels well, pain well controlled. No concerns.               Past Medical History:     Past Medical History:   Diagnosis Date     Allergic rhinitis,  cause unspecified     Allergic rhinitis     Depressive disorder 2000     Depressive disorder, not elsewhere classified      Essential hypertension 4/17/2019               Past Surgical History:     Past Surgical History:   Procedure Laterality Date     APPENDECTOMY  05/2011     BIOPSY  2011     COLONOSCOPY  2020                 Social History:     Social History     Tobacco Use     Smoking status: Never     Smokeless tobacco: Never   Vaping Use     Vaping status: Never Used   Substance Use Topics     Alcohol use: No     Drug use: No                 Family History:   I have reviewed this patient's family history  family history includes Breast Cancer in her maternal aunt, sister, and another family member; C.A.D. in her maternal grandmother; Cancer in her father; Cancer - colorectal in her paternal aunt; Cerebrovascular Disease in her maternal grandfather; Diabetes in her maternal grandmother; Mental Illness in her brother; Osteoporosis in her paternal grandmother.  Family Hx fully reviewed and is non contributory to this admission.             Allergies:     Allergies   Allergen Reactions     Chlorhexidine Gluconate              Medications:     Prior to Admission medications    Medication Sig Last Dose Taking? Auth Provider Long Term End Date   amLODIPine (NORVASC) 5 MG tablet Take 1 tablet (5 mg) by mouth daily  Patient taking differently: Take 5 mg by mouth every evening 6/16/2023 at PM Yes Bernice Calderon MD Yes    ASPIRIN 325 MG OR TABS Take 325-650 mg by mouth every 6 hours as needed for pain Unknown at PRN Yes Reported, Patient     buPROPion (WELLBUTRIN XL) 150 MG 24 hr tablet Take 1 tablet (150 mg) by mouth every morning In combination with 300mg tab for total dose 450mg 6/17/2023 at AM Yes Bernice Calderon MD Yes    buPROPion (WELLBUTRIN XL) 300 MG 24 hr tablet Take 1 tablet (300 mg) by mouth every morning 6/17/2023 at AM Yes Bernice Calderon MD Yes    escitalopram (LEXAPRO) 20 MG tablet  "Take 1 tablet (20 mg) by mouth daily 6/17/2023 at AM Yes Bernice Calderon MD Yes    multivitamin w/minerals (THERA-VIT-M) tablet Take 1 tablet by mouth every evening 6/16/2023 at PM Yes Reported, Patient     Omega-3 Fatty Acids (OMEGA-3 FISH OIL PO) Take 1 g by mouth every evening 6/16/2023 at PM Yes Reported, Patient                 Review of Systems:   A comprehensive greater than 10 system review of systems was carried out.  Pertinent positives and negatives are noted above.  Otherwise negative for contributory info.            Physical Exam:   Vitals were reviewed  Blood pressure 108/61, pulse 70, temperature 97.6  F (36.4  C), temperature source Oral, resp. rate 16, height 1.626 m (5' 4\"), weight 76.5 kg (168 lb 9.6 oz), last menstrual period 01/01/2010, SpO2 93 %, not currently breastfeeding.  Exam:    GENERAL:  Comfortable.  PSYCH: pleasant, oriented, No acute distress.  HEENT:  PERRLA. Normal conjunctiva, normal hearing, nasal mucosa and Oropharynx are normal.  NECK:  Supple, no neck vein distention, adenopathy or bruits, normal thyroid.  HEART:  Normal S1, S2 with no murmur, no pericardial rub, S3 or S4.  LUNGS:  Clear to auscultation, normal Respiratory effort.  ABDOMEN:  Soft, no hepatosplenomegaly, normal bowel sounds.  EXTREMITIES:  No pedal edema, +2 pulses bilateral and equal.  SKIN:  Dry to touch, No rash, wound or ulcerations.  NEUROLOGIC:  Nonfocal with normal cranial nerve and motor power and sensation.            Data:   Past 24 hours labs, studies, and imaging were reviewed.  No results for input(s): PH, PHV, PO2, PO2V, SAT, PCO2, PCO2V, HCO3, HCO3V in the last 168 hours.  Recent Labs   Lab 06/17/23  1549   WBC 10.0   HGB 13.3   HCT 38.0   MCV 94             Lab Results   Component Value Date     06/18/2023     06/17/2023     06/12/2022     03/27/2021     04/16/2019     02/20/2017    Lab Results   Component Value Date    CHLORIDE 107 06/18/2023    " CHLORIDE 106 06/17/2023    CHLORIDE 108 06/12/2022    CHLORIDE 108 03/27/2021    CHLORIDE 109 04/16/2019    CHLORIDE 107 02/20/2017    Lab Results   Component Value Date    BUN 14.0 06/18/2023    BUN 17.1 06/17/2023    BUN 17 06/12/2022    BUN 18 03/27/2021    BUN 15 04/16/2019    BUN 16 02/20/2017      Lab Results   Component Value Date    POTASSIUM 4.2 06/18/2023    POTASSIUM 4.1 06/17/2023    POTASSIUM 4.2 06/12/2022    POTASSIUM 4.0 03/27/2021    POTASSIUM 3.9 04/16/2019    POTASSIUM 4.1 02/20/2017    Lab Results   Component Value Date    CO2 24 06/18/2023    CO2 25 06/17/2023    CO2 27 06/12/2022    CO2 27 03/27/2021    CO2 25 04/16/2019    CO2 27 02/20/2017    Lab Results   Component Value Date    CR 1.04 06/18/2023    CR 1.07 06/17/2023    CR 1.03 06/12/2022    CR 1.08 03/27/2021    CR 0.99 04/16/2019    CR 0.98 02/20/2017        Recent Labs   Lab 06/18/23  0545 06/17/23  1549    140   POTASSIUM 4.2 4.1   CHLORIDE 107 106   CO2 24 25   ANIONGAP 9 9   * 112*   BUN 14.0 17.1   CR 1.04* 1.07*   GFRESTIMATED 60* 58*   MIRIAM 9.6 9.9   MAG 2.0  --    PHOS 3.3  --      No results for input(s): NTBNPI, NTBNP in the last 168 hours.  No results for input(s): DD in the last 168 hours.  Recent Labs   Lab 06/17/23  1549   INR 1.15     No results for input(s): LACT in the last 168 hours.  No results for input(s): TSH in the last 168 hours.  No results for input(s): TROPONIN, TROPI, TROPR, TROPONINIS in the last 168 hours.    Invalid input(s): TROPT, TROP, TROPONINIES, TNIH  No results for input(s): COLOR, APPEARANCE, URINEGLC, URINEBILI, URINEKETONE, SG, UBLD, URINEPH, PROTEIN, UROBILINOGEN, NITRITE, LEUKEST, RBCU, WBCU in the last 168 hours.    Maude Fonseca PA-C

## 2023-06-18 NOTE — PROGRESS NOTES
06/18/23 1552   Appointment Info   Signing Clinician's Name / Credentials (PT) Berlin Amaro DPT   Living Environment   People in Home spouse   Current Living Arrangements house   Home Accessibility stairs within home   Number of Stairs, Within Home, Primary six   Stair Railings, Within Home, Primary railings safe and in good condition   Transportation Anticipated family or friend will provide   Self-Care   Usual Activity Tolerance good   Current Activity Tolerance good   Equipment Currently Used at Home none   Fall history within last six months no   Activity/Exercise/Self-Care Comment Owns 2 horses and likes to ride   General Information   Onset of Illness/Injury or Date of Surgery 06/17/23   Referring Physician Antonio Burt MD   Patient/Family Therapy Goals Statement (PT) Return home   Pertinent History of Current Problem (include personal factors and/or comorbidities that impact the POC) Patient  is a 63yo female with a past medical history significant for HTN and anxiety/depression who was admitted 6/17/23 after falling from her horse. Was found to have right anterior rib fractures (2-6) and a very small volume subdural hematoma.   Cognition   Affect/Mental Status (Cognition) WFL   Orientation Status (Cognition) oriented x 4   Follows Commands (Cognition) WFL   Pain Assessment   Patient Currently in Pain No   Integumentary/Edema   Integumentary/Edema no deficits were identifed   Posture    Posture Not impaired   Range of Motion (ROM)   Range of Motion ROM is WFL   Strength (Manual Muscle Testing)   Strength (Manual Muscle Testing) strength is WFL   Bed Mobility   Bed Mobility scooting/bridging;supine-sit;sit-supine   Scooting/Bridging [place] (Bed Mobility) independent   Supine-Sit [place] (Bed Mobility) independent   Sit-Supine [place] (Bed Mobility) independent   Transfers   Transfers sit-stand transfer   Sit-Stand Transfer   Sit-Stand [place] (Transfers) independent   Gait/Stairs  (Locomotion)   [place] Level (Gait) independent   Distance in Feet 200   Pattern (Gait) step-through;swing-through   Balance   Balance no deficits were identified   Sensory Examination   Sensory Perception WNL   Coordination   Coordination no deficits were identified   Muscle Tone   Muscle Tone no deficits were identified   Clinical Impression   Criteria for Skilled Therapeutic Intervention Evaluation only   PT Diagnosis (PT) Impaired mobility   Influenced by the following impairments Rib pain   Functional limitations due to impairments Impaired mobility   Clinical Presentation (PT Evaluation Complexity) Stable/Uncomplicated   Clinical Presentation Rationale Clinical judgement   Clinical Decision Making (Complexity) low complexity   Risk & Benefits of therapy have been explained evaluation/treatment results reviewed;care plan/treatment goals reviewed;risks/benefits reviewed;current/potential barriers reviewed;participants voiced agreement with care plan;participants included;patient   Clinical Impression Comments Patient presents with independent functional mobility. Demonstrates ability to perform all transfers and ambulation with good safety, no increase in pain, and good activity tolerance. No discharge concerns at this time. Patient demonstrates ability to return home at discharge. No skilled physical therapy needs identified.   PT Total Evaluation Time   PT Eval, Low Complexity Minutes (92703) 15   PT Discharge Planning   PT Plan Eval only   PT Discharge Recommendation (DC Rec) home   PT Rationale for DC Rec Independent transfers and ambulation without AD                                                                                   [facility]      OUTPATIENT PHYSICAL THERAPY EVALUATION  PLAN OF TREATMENT FOR OUTPATIENT REHABILITATION  (COMPLETE FOR INITIAL CLAIMS ONLY)  Patient's Last Name, First Name, M.I.  YOB: 1960  Sanjuanita Saul                         Provider's Name  [facility] Medical Record No.  [id number]                               Onset Date:  06/17/23   Start of Care Date:         Type:     _X_PT   ___OT   ___SLP Medical Diagnosis:                           PT Diagnosis:  (P) Impaired mobility   Visits from SOC:  1   _________________________________________________________________________________  Plan of Treatment/Functional Goals    Planned Interventions:       Goals: See Physical Therapy Goals on Care Plan in [employer] electronic health record.    Therapy Frequency:    Predicted Duration of Therapy Intervention:    _________________________________________________________________________________    I CERTIFY THE NEED FOR THESE SERVICES FURNISHED UNDER        THIS PLAN OF TREATMENT AND WHILE UNDER MY CARE .             Physician Signature               Date    X_____________________________________________________                   ,      Referring Physician: Antonio Burt MD            Initial Assessment        See Physical Therapy evaluation dated   in [employer] electronic health record.

## 2023-06-18 NOTE — PLAN OF CARE
Discharge Note    Patient discharged to home via private vehicle  accompanied by brother.  IV: Discontinued  Prescriptions N/A.   Belongings reviewed and sent with patient.   Home medications returned to patient: NA  Equipment sent with: patient, N/A.   patient verbalizes understanding of discharge instructions. AVS given to patient.  Additional education completed? Patient advised to return to ED if symptoms worsen

## 2023-06-19 ENCOUNTER — TELEPHONE (OUTPATIENT)
Dept: NEUROSURGERY | Facility: CLINIC | Age: 63
End: 2023-06-19
Payer: COMMERCIAL

## 2023-06-19 LAB
ATRIAL RATE - MUSE: 79 BPM
DIASTOLIC BLOOD PRESSURE - MUSE: NORMAL MMHG
INTERPRETATION ECG - MUSE: NORMAL
P AXIS - MUSE: 32 DEGREES
PR INTERVAL - MUSE: 150 MS
QRS DURATION - MUSE: 88 MS
QT - MUSE: 334 MS
QTC - MUSE: 382 MS
R AXIS - MUSE: 16 DEGREES
SYSTOLIC BLOOD PRESSURE - MUSE: NORMAL MMHG
T AXIS - MUSE: 68 DEGREES
VENTRICULAR RATE- MUSE: 79 BPM

## 2023-06-19 NOTE — TELEPHONE ENCOUNTER
[facility] needs to schedule 4 week hospital follow up with head CT prior per staff message.

## 2023-06-20 NOTE — DISCHARGE SUMMARY
Surgery Discharge Summary    Sanjuanita Saul MRN# [id number]   YOB: 1960 Age: 62 year old     Date of Admission:  6/17/2023  Date of Discharge:  6/18/2023  6:27 PM  Admitting Physician:  Antonio Burt MD  Discharging Service:  General Surgery   Primary Provider: Bernice Calderon    Discharge Diagnosis:   Principle Diagnosis:  Subdural hemorrhage (H) [I62.00]  Closed fracture of multiple ribs of right side, initial encounter [S22.41XA]    Brief HPI: Sanjuanita Saul is a 62 year old year-old female who presents after falling from a horse. The patient reportedly had a loss of consciousness at the scene, having hit her head. She was wearing a helmet. She complains of right-sided chest pain and head pain. She denies any other areas of discomfort. She presented to the emergency room and a CT scan of the head revealed a small subdural hematoma. Subsequent chest CT revealed multiple right-sided rib fractures. I was asked to admit for the trauma service pending neurosurgery consultation. The neurosurgery team is aware and has reviewed the films. They have requested a follow-up CT scan tomorrow morning. I have placed rib fracture orders. I will place a formal consult for neurosurgery and for the hospitalist service to assist with medication management.       Hospital Course: Sanjuanita Saul was admitted to [facility] and was seen by the hospitalist service and neurosurgery. Please see their notes for specific details. The patient had a routine hospital course and recovered as anticipated. She was placed on a regular diet and was transitioned successfully to oral pain medications. She was  ambulating independently and voiding spontaneously. By day of discharge, she remained afebrile, was tolerating an oral diet, had adequate pain control on oral medications and was ambulating independently thus medically appropriate for discharge to home. She was recommended to follow-up with neurosurgery in one month after  "a repeat CT scan.    Inpatient Consultations: Consultation during this admission received from internal medicine and neurosurgery.    Procedures:        Disposition:   Discharged to home     Discharge Condition  Discharge condition: Stable   Discharge vitals: Blood pressure 126/69, pulse 74, temperature 97.6  F (36.4  C), temperature source Oral, resp. rate 16, height 1.626 m (5' 4\"), weight 76.5 kg (168 lb 9.6 oz), last menstrual period 01/01/2010, SpO2 93 %, not currently breastfeeding.     Discharge Medications:   Discharge Medication List as of 6/18/2023  5:48 PM      CONTINUE these medications which have NOT CHANGED    Details   amLODIPine (NORVASC) 5 MG tablet Take 1 tablet (5 mg) by mouth daily, Disp-90 tablet, R-3, E-Prescribe      ASPIRIN 325 MG OR TABS Take 325-650 mg by mouth every 6 hours as needed for pain, Historical      !! buPROPion (WELLBUTRIN XL) 150 MG 24 hr tablet Take 1 tablet (150 mg) by mouth every morning In combination with 300mg tab for total dose 450mg, Disp-90 tablet, R-3, E-Prescribe      !! buPROPion (WELLBUTRIN XL) 300 MG 24 hr tablet Take 1 tablet (300 mg) by mouth every morning, Disp-90 tablet, R-3, E-Prescribe      escitalopram (LEXAPRO) 20 MG tablet Take 1 tablet (20 mg) by mouth daily, Disp-90 tablet, R-3, E-Prescribe      multivitamin w/minerals (THERA-VIT-M) tablet Take 1 tablet by mouth every evening, Historical      Omega-3 Fatty Acids (OMEGA-3 FISH OIL PO) Take 1 g by mouth every evening, Historical       !! - Potential duplicate medications found. Please discuss with provider.          Discharge Instructions:   Follow-up Appointments     Follow-up and recommended labs and tests       Follow-up with neurosurgery           After Care Instructions     Activity      Your activity upon discharge: activity as tolerated         Diet      Follow this diet upon discharge: Orders Placed This Encounter      Regular Diet Adult               Future tests that were ordered for you     CT " Head w/o Contrast          HOME CARE FOLLOWING TRAUMA ADMISSION - NONSURGICAL FRACTURES/ABRASIONS  FREEDOM Coreas, TANISHA Nick, HARJINDER Bravo J. Shaheen    NEXT APPOINTMENT:  Follow-up with your primary care provider in 2-3 days for recheck of your injuries and overall medical status.  At that time you may address ongoing care recommendations and activity restrictions.    WOUND CARE:  Apply bacitracin to abrasions twice a day and cover sites with non-stick dressings afterwards.      ACTIVITY:  Light Activity -- you may immediately be up and about as tolerated.  Driving -- you may drive when comfortable and off narcotic pain medications.  Light Work -- resume when comfortable off pain medications.  (If you can drive, you probably can work.)  Strenuous Work/Activity -- limit lifting to 10 pounds for 2 weeks.  Restrictions after this time should be recommended by your primary care provider.    DISCOMFORT:  Use pain medications as prescribed by your surgeon.  Take the pain medication with some food, when possible, to minimize side effects.  Expect gradual improvement.    DIET:  Return to diet you were on before surgery.  Drink plenty of fluids.  While taking pain medications, increase dietary fiber or add a fiber supplementation like Metamucil or Citrucel to help prevent constipation - a possible side effect of pain medications.    Surgeon office contact number:  Office Phone:  [phone]    I did not personally see or examine the patient on the day of discharge.  Summary was completed by chart review.   Jeremiah Moy PA-C

## 2023-06-28 ENCOUNTER — LAB (OUTPATIENT)
Dept: LAB | Facility: CLINIC | Age: 63
End: 2023-06-28
Payer: COMMERCIAL

## 2023-06-28 DIAGNOSIS — Z00.00 ROUTINE GENERAL MEDICAL EXAMINATION AT A HEALTH CARE FACILITY: ICD-10-CM

## 2023-06-28 DIAGNOSIS — I10 ESSENTIAL HYPERTENSION: ICD-10-CM

## 2023-06-28 LAB
ALBUMIN SERPL BCG-MCNC: 4.1 G/DL (ref 3.5–5.2)
ALP SERPL-CCNC: 115 U/L (ref 35–104)
ALT SERPL W P-5'-P-CCNC: 19 U/L (ref 0–50)
ANION GAP SERPL CALCULATED.3IONS-SCNC: 9 MMOL/L (ref 7–15)
AST SERPL W P-5'-P-CCNC: 36 U/L (ref 0–45)
BILIRUB SERPL-MCNC: 0.3 MG/DL
BUN SERPL-MCNC: 14.6 MG/DL (ref 8–23)
CALCIUM SERPL-MCNC: 10.1 MG/DL (ref 8.8–10.2)
CHLORIDE SERPL-SCNC: 107 MMOL/L (ref 98–107)
CHOLEST SERPL-MCNC: 189 MG/DL
CREAT SERPL-MCNC: 0.97 MG/DL (ref 0.51–0.95)
CREAT UR-MCNC: 81.6 MG/DL
DEPRECATED HCO3 PLAS-SCNC: 25 MMOL/L (ref 22–29)
GFR SERPL CREATININE-BSD FRML MDRD: 65 ML/MIN/1.73M2
GLUCOSE SERPL-MCNC: 85 MG/DL (ref 70–99)
HDLC SERPL-MCNC: 49 MG/DL
LDLC SERPL CALC-MCNC: 121 MG/DL
MICROALBUMIN UR-MCNC: <12 MG/L
MICROALBUMIN/CREAT UR: NORMAL MG/G{CREAT}
NONHDLC SERPL-MCNC: 140 MG/DL
POTASSIUM SERPL-SCNC: 4.5 MMOL/L (ref 3.4–5.3)
PROT SERPL-MCNC: 7.2 G/DL (ref 6.4–8.3)
SODIUM SERPL-SCNC: 141 MMOL/L (ref 136–145)
TRIGL SERPL-MCNC: 93 MG/DL
TSH SERPL DL<=0.005 MIU/L-ACNC: 2.2 UIU/ML (ref 0.3–4.2)

## 2023-06-28 PROCEDURE — 82570 ASSAY OF URINE CREATININE: CPT

## 2023-06-28 PROCEDURE — 82043 UR ALBUMIN QUANTITATIVE: CPT

## 2023-06-28 PROCEDURE — 80061 LIPID PANEL: CPT

## 2023-06-28 PROCEDURE — 80053 COMPREHEN METABOLIC PANEL: CPT

## 2023-06-28 PROCEDURE — 84443 ASSAY THYROID STIM HORMONE: CPT

## 2023-06-28 PROCEDURE — 36415 COLL VENOUS BLD VENIPUNCTURE: CPT

## 2023-07-07 ENCOUNTER — NURSE TRIAGE (OUTPATIENT)
Dept: PEDIATRICS | Facility: CLINIC | Age: 63
End: 2023-07-07
Payer: COMMERCIAL

## 2023-07-07 NOTE — TELEPHONE ENCOUNTER
Patient calling due to having sudden vaginal bleeding that started this afternoon. It is continuous bleeding and she states she has less than a quarter of a teaspoon.    Per protocol pt needs to be seen within next 2 weeks but pt is requesting to be seen or have advisement from PCP due to abruptly starting abnormal bleeding.    Thanks!    Reason for Disposition    Age > 39 years with irregular or excessive bleeding    Additional Information    Negative: SEVERE vaginal bleeding (e.g., continuous red blood from vagina, or large blood clots) and very weak (can't stand)    Negative: Passed out (i.e., fainted, collapsed and was not responding)    Negative: Difficult to awaken or acting confused (e.g., disoriented, slurred speech)    Negative: Shock suspected (e.g., cold/pale/clammy skin, too weak to stand, low BP, rapid pulse)    Negative: Sounds like a life-threatening emergency to the triager    Negative: Pregnant 20 or more weeks (5 months or more)    Negative: Pregnant < 20 weeks (less than 5 months)    Negative: Postpartum (from 0 to 6 weeks after delivery)    Negative: Vaginal discharge is main symptom and bleeding is slight    Negative: SEVERE abdominal pain (e.g., excruciating)    Negative: SEVERE dizziness (e.g., unable to stand, requires support to walk, feels like passing out now)    Negative: SEVERE vaginal bleeding (e.g., soaking 2 pads or tampons per hour and present 2 or more hours; 1 menstrual cup every 2 hours)    Negative: MODERATE vaginal bleeding (i.e., soaking pad or tampon per hour and present > 6 hours; 1 menstrual cup every 6 hours)    Negative: Pale skin (pallor) of new-onset or worsening    Negative: Constant abdominal pain lasting > 2 hours    Negative: Patient sounds very sick or weak to the triager    Negative: Taking Coumadin (warfarin) or other strong blood thinner, or known bleeding disorder (e.g., thrombocytopenia)    Negative: Skin bruises or nosebleed and not caused by an injury     "Negative: Bleeding/spotting after procedure (e.g., biopsy) or pelvic examination (e.g., pap smear) that persists > 3 days    Negative: Patient wants to be seen    Negative: Passed tissue (e.g., gray-white)    Answer Assessment - Initial Assessment Questions  1. AMOUNT: \"Describe the bleeding that you are having.\"     - SPOTTING: spotting, or pinkish / brownish mucous discharge; does not fill panty liner or pad     - MILD:  less than 1 pad / hour; less than patient's usual menstrual bleeding    - MODERATE: 1-2 pads / hour; 1 menstrual cup every 6 hours; small-medium blood clots (e.g., pea, grape, small coin)    - SEVERE: soaking 2 or more pads/hour for 2 or more hours; 1 menstrual cup every 2 hours; bleeding not contained by pads or continuous red blood from vagina; large blood clots (e.g., golf ball, large coin)       Red blood, and very mild.  2. ONSET: \"When did the bleeding begin?\" \"Is it continuing now?\"      This afternoon and is continuous.  3. MENSTRUAL PERIOD: \"When was the last normal menstrual period?\" \"How is this different than your period?\"      When she was about 48 years old so about 14 years ago.  4. REGULARITY: \"How regular are your periods?\"      Does not have them anymore.  5. ABDOMINAL PAIN: \"Do you have any pain?\" \"How bad is the pain?\"  (e.g., Scale 1-10; mild, moderate, or severe)    - MILD (1-3): doesn't interfere with normal activities, abdomen soft and not tender to touch     - MODERATE (4-7): interferes with normal activities or awakens from sleep, abdomen tender to touch     - SEVERE (8-10): excruciating pain, doubled over, unable to do any normal activities       None  6. PREGNANCY: \"Could you be pregnant?\" \"Are you sexually active?\" \"Did you recently give birth?\"      No  7. BREASTFEEDING: \"Are you breastfeeding?\"      No  8. HORMONES: \"Are you taking any hormone medications, prescription or OTC?\" (e.g., birth control pills, estrogen)      No  9. BLOOD THINNERS: \"Do you take any blood " "thinners?\" (e.g., Coumadin/warfarin, Pradaxa/dabigatran, aspirin)      No  10. CAUSE: \"What do you think is causing the bleeding?\" (e.g., recent gyn surgery, recent gyn procedure; known bleeding disorder, cervical cancer, polycystic ovarian disease, fibroids)          Unsure  11. HEMODYNAMIC STATUS: \"Are you weak or feeling lightheaded?\" If Yes, ask: \"Can you stand and walk normally?\"         No  12. OTHER SYMPTOMS: \"What other symptoms are you having with the bleeding?\" (e.g., passed tissue, vaginal discharge, fever, menstrual-type cramps)        No    Protocols used: VAGINAL BLEEDING - [id number]      David Mireles RN   [facility]      "

## 2023-07-13 ENCOUNTER — OFFICE VISIT (OUTPATIENT)
Dept: PEDIATRICS | Facility: CLINIC | Age: 63
End: 2023-07-13
Payer: COMMERCIAL

## 2023-07-13 VITALS
TEMPERATURE: 98 F | SYSTOLIC BLOOD PRESSURE: 122 MMHG | HEART RATE: 94 BPM | RESPIRATION RATE: 18 BRPM | BODY MASS INDEX: 29.18 KG/M2 | DIASTOLIC BLOOD PRESSURE: 70 MMHG | WEIGHT: 170 LBS | OXYGEN SATURATION: 98 %

## 2023-07-13 DIAGNOSIS — I62.00 SUBDURAL HEMORRHAGE (H): ICD-10-CM

## 2023-07-13 DIAGNOSIS — S22.41XA CLOSED FRACTURE OF MULTIPLE RIBS OF RIGHT SIDE, INITIAL ENCOUNTER: ICD-10-CM

## 2023-07-13 DIAGNOSIS — N95.0 POST-MENOPAUSAL BLEEDING: Primary | ICD-10-CM

## 2023-07-13 PROCEDURE — 99213 OFFICE O/P EST LOW 20 MIN: CPT | Performed by: PEDIATRICS

## 2023-07-13 ASSESSMENT — PAIN SCALES - GENERAL: PAINLEVEL: NO PAIN (0)

## 2023-07-13 NOTE — PROGRESS NOTES
Assessment & Plan       ICD-10-CM    1. Post-menopausal bleeding  N95.0 US Pelvic Complete with Transvaginal     Ob/Gyn Referral    New onset vaginal bleeding after menopause.   Needs pelvic US and Ob/Gyn visit for evaluation of need for EMB      2. Subdural hemorrhage (H)  I62.00 Repeat head CT and neurosurgery follow up planned for next week      3. Closed fracture of multiple ribs of right side, initial encounter  S22.41XA Improving well            Bernice Calderon MD  [facility] DESIRAE Gann is a 62 year old, presenting for the following health issues:  Vaginal Problem        7/13/2023     2:14 PM   Additional Questions   Roomed by Hollie Scott CMA   Accompanied by JEB     Vaginal Problem     History of Present Illness       Reason for visit:  Vaginal spotting  Symptom onset:  3-7 days ago  Symptoms include:  Blood spotting  Symptom intensity:  Mild  Symptom progression:  Improving  Had these symptoms before:  No  What makes it worse:  No  What makes it better:  No    She eats 2-3 servings of fruits and vegetables daily.She consumes 1 sweetened beverage(s) daily.She exercises with enough effort to increase her heart rate 30 to 60 minutes per day.  She exercises with enough effort to increase her heart rate 4 days per week. She is missing 1 dose(s) of medications per week.  She is not taking prescribed medications regularly due to remembering to take.     Recent hospitalization for subdural hemorrhage, multiple right rib fractures after being thrown off horse.  Neurosurgery to see her next week for her 1 month repeat CT scan to follow subdural.    Has been recovering well from her injuries and is back to work.        New concern:  Last Friday - had new onset of vaginal bleeding - blood spotting on panties - just for one day and has not recurred.  No abdominal cramping, pressure, or pain.   No vaginal or pelvic trauma - no injury to this area in recent horse  accident.            Review of Systems   Genitourinary: Positive for vaginal discharge.          Objective    /70 (BP Location: Right arm, Patient Position: Sitting, Cuff Size: Adult Large)   Pulse 94   Temp 98  F (36.7  C) (Tympanic)   Resp 18   Wt 77.1 kg (170 lb)   LMP 01/01/2010   SpO2 98%   BMI 29.18 kg/m    Body mass index is 29.18 kg/m .   Wt Readings from Last 4 Encounters:   07/13/23 77.1 kg (170 lb)   06/18/23 76.5 kg (168 lb 9.6 oz)   06/05/23 77.1 kg (170 lb)   06/01/22 77.2 kg (170 lb 4.8 oz)       Physical Exam   GENERAL: healthy, alert and no distress  NECK: no adenopathy, no asymmetry, masses, or scars and thyroid normal to palpation  RESP: lungs clear to auscultation - no rales, rhonchi or wheezes  CV: regular rate and rhythm, normal S1 S2, no S3 or S4, no murmur, click or rub, no peripheral edema and peripheral pulses strong  ABDOMEN: soft, nontender, no hepatosplenomegaly, no masses and bowel sounds normal  PSYCH: mentation appears normal, affect normal/bright

## 2023-07-18 ENCOUNTER — ANCILLARY PROCEDURE (OUTPATIENT)
Dept: CT IMAGING | Facility: CLINIC | Age: 63
End: 2023-07-18
Attending: PHYSICIAN ASSISTANT
Payer: COMMERCIAL

## 2023-07-18 DIAGNOSIS — I62.00 SUBDURAL HEMORRHAGE (H): ICD-10-CM

## 2023-07-18 PROCEDURE — 70450 CT HEAD/BRAIN W/O DYE: CPT

## 2023-07-19 ENCOUNTER — OFFICE VISIT (OUTPATIENT)
Dept: NEUROSURGERY | Facility: CLINIC | Age: 63
End: 2023-07-19
Payer: COMMERCIAL

## 2023-07-19 ENCOUNTER — ANCILLARY PROCEDURE (OUTPATIENT)
Dept: ULTRASOUND IMAGING | Facility: CLINIC | Age: 63
End: 2023-07-19
Attending: PEDIATRICS
Payer: COMMERCIAL

## 2023-07-19 VITALS — DIASTOLIC BLOOD PRESSURE: 75 MMHG | SYSTOLIC BLOOD PRESSURE: 111 MMHG | HEART RATE: 99 BPM | OXYGEN SATURATION: 98 %

## 2023-07-19 DIAGNOSIS — S06.5XAA SDH (SUBDURAL HEMATOMA) (H): Primary | ICD-10-CM

## 2023-07-19 DIAGNOSIS — N95.0 POST-MENOPAUSAL BLEEDING: ICD-10-CM

## 2023-07-19 PROCEDURE — 99213 OFFICE O/P EST LOW 20 MIN: CPT | Performed by: NURSE PRACTITIONER

## 2023-07-19 PROCEDURE — 76830 TRANSVAGINAL US NON-OB: CPT | Performed by: OBSTETRICS & GYNECOLOGY

## 2023-07-19 PROCEDURE — 76856 US EXAM PELVIC COMPLETE: CPT | Performed by: OBSTETRICS & GYNECOLOGY

## 2023-07-19 ASSESSMENT — PAIN SCALES - GENERAL: PAINLEVEL: NO PAIN (0)

## 2023-07-19 NOTE — NURSING NOTE
"Sanjuanita Saul is a 62 year old female who presents for:  Chief Complaint   Patient presents with     RECHECK        Initial Vitals:  /75   Pulse 99   LMP 01/01/2010   SpO2 98%  Estimated body mass index is 29.18 kg/m  as calculated from the following:    Height as of 6/17/23: 5' 4\" (1.626 m).    Weight as of 7/13/23: 170 lb (77.1 kg).. There is no height or weight on file to calculate BSA. BP completed using cuff size: regular  No Pain (0)    Karel So    "

## 2023-07-19 NOTE — PROGRESS NOTES
[facility]   Follow Up Visit      HPI: Sanjuanita Saul is a 62 year old female who presents for 1 month follow-up of small tentorial subdural hematomas.  Patient was admitted on 6/17/2023 with a head injury after she fell from a horse.  Neurosurgery was consulted and recommended follow-up in 1 month with head CT prior. Today, patient reports feeling well and denies any concerns. Denies any headaches, dizziness, nausea, vision/speech changes, or other neurological changes.     Past Medical History:   Diagnosis Date     Allergic rhinitis, cause unspecified     Allergic rhinitis     Depressive disorder 2000     Depressive disorder, not elsewhere classified      Essential hypertension 4/17/2019         Past Surgical History:   Procedure Laterality Date     APPENDECTOMY  05/2011     BIOPSY  2011     COLONOSCOPY  2020       Current Outpatient Medications   Medication     amLODIPine (NORVASC) 5 MG tablet     buPROPion (WELLBUTRIN XL) 150 MG 24 hr tablet     buPROPion (WELLBUTRIN XL) 300 MG 24 hr tablet     escitalopram (LEXAPRO) 20 MG tablet     multivitamin w/minerals (THERA-VIT-M) tablet     Omega-3 Fatty Acids (OMEGA-3 FISH OIL PO)     No current facility-administered medications for this visit.       Allergies   Allergen Reactions     Chlorhexidine Gluconate        Social History     Socioeconomic History     Marital status: Single   Occupational History     Occupation: [employer]     Occupation: Nursing student   Tobacco Use     Smoking status: Never     Smokeless tobacco: Never   Vaping Use     Vaping Use: Never used   Substance and Sexual Activity     Alcohol use: No     Drug use: No     Sexual activity: Never     Comment: never sexually active   Other Topics Concern     Parent/sibling w/ CABG, MI or angioplasty before 65F 55M? No   Social History Narrative    previous pap screens normal, last pap screen at physical not completed due to patient discomfort     Social Determinants of Health      Financial Resource Strain: Low Risk  (2023)    Overall Financial Resource Strain (CARDIA)      Difficulty of Paying Living Expenses: Not hard at all   Food Insecurity: No Food Insecurity (2023)    Hunger Vital Sign      Worried About Running Out of Food in the Last Year: Never true      Ran Out of Food in the Last Year: Never true   Transportation Needs: No Transportation Needs (2023)    PRAPARE - Transportation      Lack of Transportation (Medical): No      Lack of Transportation (Non-Medical): No   Physical Activity: Sufficiently Active (2023)    Exercise Vital Sign      Days of Exercise per Week: 5 days      Minutes of Exercise per Session: 30 min   Stress: No Stress Concern Present (2023)    [place] [place] [employer] - Occupational Stress Questionnaire      Feeling of Stress : Not at all   Social Connections: Socially Isolated (2023)    Social Connection and Isolation Panel [NHANES]      Frequency of Communication with Friends and Family: Never      Frequency of Social Gatherings with Friends and Family: Once a week      Attends Yarsani Services: Never      Active Member of Clubs or Organizations: No      Marital Status: Never    Housing Stability: Low Risk  (2023)    Housing Stability Vital Sign      Unable to Pay for Housing in the Last Year: No      Number of Places Lived in the Last Year: 1      Unstable Housing in the Last Year: No       Family History   Problem Relation Age of Onset     Cancer Father         Lung Cancer.  age 62     Breast Cancer Sister      C.A.D. Maternal Grandmother         MI age 92     Diabetes Maternal Grandmother      Cerebrovascular Disease Maternal Grandfather         age 82     Osteoporosis Paternal Grandmother          in her 80's     Breast Cancer Maternal Aunt      Cancer - colorectal Paternal Aunt      Breast Cancer Other      Mental Illness Brother         Maybe autism       ROS: 10 point ROS neg other than the  symptoms noted above in the HPI.    Vital Signs: /75   Pulse 99   LMP 01/01/2010   SpO2 98%     Examination:  Neurological:  Awake  Alert  Oriented x 3  Speech clear  Cranial nerves II - XII intact  PERRL  EOMI  Face symmetric  Tongue midline  Motor exam:  5/5 strength in all four extremities.   Sensation intact  Finger to Nose smooth  Pronator drift negative   Gait: Able to stand from a seated position. Normal gait.    Imaging:   EXAM: CT HEAD W/O CONTRAST  LOCATION: [facility]  DATE: 7/18/2023  IMPRESSION:  1.  No evidence of residual or recurrent subdural blood.   2.  No evidence of an acute intracranial abnormality.  3.  Mild age-related change.    Assessment/Plan:   62 year old female who presents for 1 month follow-up of small tentorial subdural hematomas. Repeat head CT shows subdural hematomas have resolved, no evidence of new intracranial hemorrhage. Patient is doing well, denies any symptoms or concerns today. Advised patient to follow-up with our clinic as needed. Patient voiced understanding and agreement.      Kellen Pisano CNP  [facility]  [address] [place] [id number]  Tel [phone]  Pager [phone]

## 2023-07-19 NOTE — LETTER
7/19/2023         RE: Sanjuanita Saul  [address]  [facility] [address]        Dear Colleague,    Thank you for referring your patient, Sanjuanita Saul, to the [facility]. Please see a copy of my visit note below.    [facility]   Follow Up Visit      HPI: Sanjuanita Saul is a 62 year old female who presents for 1 month follow-up of small tentorial subdural hematomas.  Patient was admitted on 6/17/2023 with a head injury after she fell from a horse.  Neurosurgery was consulted and recommended follow-up in 1 month with head CT prior. Today, patient reports feeling well and denies any concerns. Denies any headaches, dizziness, nausea, vision/speech changes, or other neurological changes.     Past Medical History:   Diagnosis Date     Allergic rhinitis, cause unspecified     Allergic rhinitis     Depressive disorder 2000     Depressive disorder, not elsewhere classified      Essential hypertension 4/17/2019         Past Surgical History:   Procedure Laterality Date     APPENDECTOMY  05/2011     BIOPSY  2011     COLONOSCOPY  2020       Current Outpatient Medications   Medication     amLODIPine (NORVASC) 5 MG tablet     buPROPion (WELLBUTRIN XL) 150 MG 24 hr tablet     buPROPion (WELLBUTRIN XL) 300 MG 24 hr tablet     escitalopram (LEXAPRO) 20 MG tablet     multivitamin w/minerals (THERA-VIT-M) tablet     Omega-3 Fatty Acids (OMEGA-3 FISH OIL PO)     No current facility-administered medications for this visit.       Allergies   Allergen Reactions     Chlorhexidine Gluconate        Social History     Socioeconomic History     Marital status: Single   Occupational History     Occupation: [employer]     Occupation: Nursing student   Tobacco Use     Smoking status: Never     Smokeless tobacco: Never   Vaping Use     Vaping Use: Never used   Substance and Sexual Activity     Alcohol use: No     Drug use: No     Sexual activity: Never     Comment: never sexually  active   Other Topics Concern     Parent/sibling w/ CABG, MI or angioplasty before 65F 55M? No   Social History Narrative    previous pap screens normal, last pap screen at physical not completed due to patient discomfort     Social Determinants of Health     Financial Resource Strain: Low Risk  (2023)    Overall Financial Resource Strain (CARDIA)      Difficulty of Paying Living Expenses: Not hard at all   Food Insecurity: No Food Insecurity (2023)    Hunger Vital Sign      Worried About Running Out of Food in the Last Year: Never true      Ran Out of Food in the Last Year: Never true   Transportation Needs: No Transportation Needs (2023)    PRAPARE - Transportation      Lack of Transportation (Medical): No      Lack of Transportation (Non-Medical): No   Physical Activity: Sufficiently Active (2023)    Exercise Vital Sign      Days of Exercise per Week: 5 days      Minutes of Exercise per Session: 30 min   Stress: No Stress Concern Present (2023)    [employer] [place] [employer] - Occupational Stress Questionnaire      Feeling of Stress : Not at all   Social Connections: Socially Isolated (2023)    Social Connection and Isolation Panel [NHANES]      Frequency of Communication with Friends and Family: Never      Frequency of Social Gatherings with Friends and Family: Once a week      Attends Orthodox Services: Never      Active Member of Clubs or Organizations: No      Marital Status: Never    Housing Stability: Low Risk  (2023)    Housing Stability Vital Sign      Unable to Pay for Housing in the Last Year: No      Number of Places Lived in the Last Year: 1      Unstable Housing in the Last Year: No       Family History   Problem Relation Age of Onset     Cancer Father         Lung Cancer.  age 62     Breast Cancer Sister      C.A.D. Maternal Grandmother         MI age 92     Diabetes Maternal Grandmother      Cerebrovascular Disease Maternal Grandfather          age 82     Osteoporosis Paternal Grandmother          in her 80's     Breast Cancer Maternal Aunt      Cancer - colorectal Paternal Aunt      Breast Cancer Other      Mental Illness Brother         Maybe autism       ROS: 10 point ROS neg other than the symptoms noted above in the HPI.    Vital Signs: /75   Pulse 99   LMP 2010   SpO2 98%     Examination:  Neurological:  Awake  Alert  Oriented x 3  Speech clear  Cranial nerves II - XII intact  PERRL  EOMI  Face symmetric  Tongue midline  Motor exam:  5/5 strength in all four extremities.   Sensation intact  Finger to Nose smooth  Pronator drift negative   Gait: Able to stand from a seated position. Normal gait.    Imaging:   EXAM: CT HEAD W/O CONTRAST  LOCATION: [facility]  DATE: 2023  IMPRESSION:  1.  No evidence of residual or recurrent subdural blood.   2.  No evidence of an acute intracranial abnormality.  3.  Mild age-related change.    Assessment/Plan:   62 year old female who presents for 1 month follow-up of small tentorial subdural hematomas. Repeat head CT shows subdural hematomas have resolved, no evidence of new intracranial hemorrhage. Patient is doing well, denies any symptoms or concerns today. Advised patient to follow-up with our clinic as needed. Patient voiced understanding and agreement.      Kellen Pisano CNP  [facility]  [address] [address]  Tel [phone]  Pager [phone]        Again, thank you for allowing me to participate in the care of your patient.        Sincerely,        Kellen Pisano NP

## 2023-07-21 ENCOUNTER — TELEPHONE (OUTPATIENT)
Dept: OBGYN | Facility: CLINIC | Age: 63
End: 2023-07-21
Payer: COMMERCIAL

## 2023-07-21 NOTE — TELEPHONE ENCOUNTER
Pt was referred by her PCP for PMB. Pt would like to see either Andrew or Torres. Please see US on 7/19; looks like she needs an EMB. She is unable to get in until 9/20. Are we able to accommodate her sooner?    TRI Akers

## 2023-07-21 NOTE — TELEPHONE ENCOUNTER
Call to pt, pt states she will call back or [employer] msg her. [employer] msg sent to pt.    TRI Akers

## 2023-07-25 ENCOUNTER — OFFICE VISIT (OUTPATIENT)
Dept: OBGYN | Facility: CLINIC | Age: 63
End: 2023-07-25
Payer: COMMERCIAL

## 2023-07-25 VITALS — WEIGHT: 170 LBS | SYSTOLIC BLOOD PRESSURE: 124 MMHG | BODY MASS INDEX: 29.18 KG/M2 | DIASTOLIC BLOOD PRESSURE: 78 MMHG

## 2023-07-25 DIAGNOSIS — N95.0 PMB (POSTMENOPAUSAL BLEEDING): Primary | ICD-10-CM

## 2023-07-25 PROCEDURE — 88305 TISSUE EXAM BY PATHOLOGIST: CPT | Performed by: PATHOLOGY

## 2023-07-25 PROCEDURE — 58100 BIOPSY OF UTERUS LINING: CPT | Performed by: OBSTETRICS & GYNECOLOGY

## 2023-07-25 NOTE — NURSING NOTE
"Chief Complaint   Patient presents with    Abnormal Uterine Bleeding     EMB       Initial /78   Wt 77.1 kg (170 lb)   LMP 2010   BMI 29.18 kg/m   Estimated body mass index is 29.18 kg/m  as calculated from the following:    Height as of 23: 1.626 m (5' 4\").    Weight as of this encounter: 77.1 kg (170 lb).  BP completed using cuff size: regular long    Questioned patient about current smoking habits.  Pt. has never smoked.                     "

## 2023-07-25 NOTE — PROGRESS NOTES
INDICATIONS:                                                      She is here for endometrial biopsy for post-menopausal bleeding. See previous notes and recent US--within normal limits for endometrial lining but with fluid seen. Endometrial biopsy was recommended.      PROCEDURE;                                                      A speculum was placed in the vagina and cervix prepped with betadine. A tenaculum was attached to the cervix. A small Pipelle curette was inserted into the cervical canal. The uterus was sounded to 6 cm. A vigorous four quadrant biopsy was performed, removing scant amount of tissue and some serous fluid. The speculum was removed. This tissue was placed in Formalin and sent to pathology.    The patient tolerated the procedure  well.    POST PROCEDURE;                                                      There  was no cramping at the time of discharge. She  tolerated the procedure well with minimal discomfort. There were no complications. Patient was discharged in stable condition.    Patient advised to call the clinic if severe pelvic pain, fever or heavy bleeding.    Will contact with biopsy results when available.    Nila Macdonald MD

## 2023-07-26 PROBLEM — M54.2 CERVICAL PAIN: Status: RESOLVED | Noted: 2023-05-11 | Resolved: 2023-07-26

## 2023-07-26 NOTE — PROGRESS NOTES
DISCHARGE  Reason for Discharge: Patient has failed to schedule further appointments.    Equipment Issued: n/a    Discharge Plan: Patient to continue home program.    Referring Provider:  Bernice Calderon

## 2023-07-27 LAB
PATH REPORT.COMMENTS IMP SPEC: NORMAL
PATH REPORT.COMMENTS IMP SPEC: NORMAL
PATH REPORT.FINAL DX SPEC: NORMAL
PATH REPORT.GROSS SPEC: NORMAL
PATH REPORT.MICROSCOPIC SPEC OTHER STN: NORMAL
PATH REPORT.RELEVANT HX SPEC: NORMAL
PHOTO IMAGE: NORMAL

## 2023-09-18 ENCOUNTER — OFFICE VISIT (OUTPATIENT)
Dept: FAMILY MEDICINE | Facility: CLINIC | Age: 63
End: 2023-09-18
Payer: COMMERCIAL

## 2023-09-18 VITALS
HEART RATE: 91 BPM | RESPIRATION RATE: 17 BRPM | DIASTOLIC BLOOD PRESSURE: 77 MMHG | HEIGHT: 64 IN | BODY MASS INDEX: 28.68 KG/M2 | OXYGEN SATURATION: 97 % | WEIGHT: 168 LBS | TEMPERATURE: 98.1 F | SYSTOLIC BLOOD PRESSURE: 121 MMHG

## 2023-09-18 DIAGNOSIS — R10.84 GENERALIZED ABDOMINAL PAIN: Primary | ICD-10-CM

## 2023-09-18 DIAGNOSIS — S06.5XAA SDH (SUBDURAL HEMATOMA) (H): ICD-10-CM

## 2023-09-18 LAB
ALBUMIN UR-MCNC: NEGATIVE MG/DL
APPEARANCE UR: CLEAR
BILIRUB UR QL STRIP: NEGATIVE
COLOR UR AUTO: YELLOW
ERYTHROCYTE [DISTWIDTH] IN BLOOD BY AUTOMATED COUNT: 12 % (ref 10–15)
GLUCOSE UR STRIP-MCNC: NEGATIVE MG/DL
HCT VFR BLD AUTO: 37.5 % (ref 35–47)
HGB BLD-MCNC: 13.2 G/DL (ref 11.7–15.7)
HGB UR QL STRIP: NEGATIVE
KETONES UR STRIP-MCNC: ABNORMAL MG/DL
LEUKOCYTE ESTERASE UR QL STRIP: NEGATIVE
MCH RBC QN AUTO: 31.7 PG (ref 26.5–33)
MCHC RBC AUTO-ENTMCNC: 35.2 G/DL (ref 31.5–36.5)
MCV RBC AUTO: 90 FL (ref 78–100)
NITRATE UR QL: NEGATIVE
PH UR STRIP: 5.5 [PH] (ref 5–7)
PLATELET # BLD AUTO: 228 10E3/UL (ref 150–450)
RBC # BLD AUTO: 4.16 10E6/UL (ref 3.8–5.2)
SP GR UR STRIP: 1.02 (ref 1–1.03)
UROBILINOGEN UR STRIP-ACNC: 0.2 E.U./DL
WBC # BLD AUTO: 5.4 10E3/UL (ref 4–11)

## 2023-09-18 PROCEDURE — 99214 OFFICE O/P EST MOD 30 MIN: CPT | Mod: 25 | Performed by: FAMILY MEDICINE

## 2023-09-18 PROCEDURE — 36415 COLL VENOUS BLD VENIPUNCTURE: CPT | Performed by: FAMILY MEDICINE

## 2023-09-18 PROCEDURE — 81003 URINALYSIS AUTO W/O SCOPE: CPT | Performed by: FAMILY MEDICINE

## 2023-09-18 PROCEDURE — 80053 COMPREHEN METABOLIC PANEL: CPT | Performed by: FAMILY MEDICINE

## 2023-09-18 PROCEDURE — 90682 RIV4 VACC RECOMBINANT DNA IM: CPT | Performed by: FAMILY MEDICINE

## 2023-09-18 PROCEDURE — 90471 IMMUNIZATION ADMIN: CPT | Performed by: FAMILY MEDICINE

## 2023-09-18 PROCEDURE — 85027 COMPLETE CBC AUTOMATED: CPT | Performed by: FAMILY MEDICINE

## 2023-09-18 ASSESSMENT — ANXIETY QUESTIONNAIRES
6. BECOMING EASILY ANNOYED OR IRRITABLE: NOT AT ALL
5. BEING SO RESTLESS THAT IT IS HARD TO SIT STILL: NOT AT ALL
3. WORRYING TOO MUCH ABOUT DIFFERENT THINGS: NOT AT ALL
IF YOU CHECKED OFF ANY PROBLEMS ON THIS QUESTIONNAIRE, HOW DIFFICULT HAVE THESE PROBLEMS MADE IT FOR YOU TO DO YOUR WORK, TAKE CARE OF THINGS AT HOME, OR GET ALONG WITH OTHER PEOPLE: NOT DIFFICULT AT ALL
4. TROUBLE RELAXING: NOT AT ALL
7. FEELING AFRAID AS IF SOMETHING AWFUL MIGHT HAPPEN: NOT AT ALL
GAD7 TOTAL SCORE: 0
GAD7 TOTAL SCORE: 0
2. NOT BEING ABLE TO STOP OR CONTROL WORRYING: NOT AT ALL
1. FEELING NERVOUS, ANXIOUS, OR ON EDGE: NOT AT ALL

## 2023-09-18 ASSESSMENT — PATIENT HEALTH QUESTIONNAIRE - PHQ9
SUM OF ALL RESPONSES TO PHQ QUESTIONS 1-9: 1
SUM OF ALL RESPONSES TO PHQ QUESTIONS 1-9: 1
10. IF YOU CHECKED OFF ANY PROBLEMS, HOW DIFFICULT HAVE THESE PROBLEMS MADE IT FOR YOU TO DO YOUR WORK, TAKE CARE OF THINGS AT HOME, OR GET ALONG WITH OTHER PEOPLE: NOT DIFFICULT AT ALL

## 2023-09-18 ASSESSMENT — PAIN SCALES - GENERAL: PAINLEVEL: WORST PAIN (10)

## 2023-09-18 NOTE — PROGRESS NOTES
"  Assessment & Plan     SDH (subdural hematoma) (H)  2 months ago - no drainage - no ongoing headaches    Generalized abdominal pain  Wonder about gallstones, vs kidney stones vs colon issues - less likely diverticulitis due to short course.   If all is neg will get colonoscopy and if that is fine, proceed to GI referral.    - Comprehensive metabolic panel (BMP + Alb, Alk Phos, ALT, AST, Total. Bili, TP)  - CBC with platelets  - US Abdomen Limited  - UA Macroscopic with reflex to Microscopic and Culture - Lab Collect        28 minutes spent by me on the date of the encounter doing chart review, history and exam, documentation and further activities per the note       BMI:   Estimated body mass index is 29.29 kg/m  as calculated from the following:    Height as of this encounter: 1.613 m (5' 3.5\").    Weight as of this encounter: 76.2 kg (168 lb).   Weight management plan: Discussed healthy diet and exercise guidelines    See Patient Instructions    Gerda Chacon MD  [facility]    Linda Gann is a 62 year old, presenting for the following health issues:  Abdominal Pain (C/o severe pain ,constipation then diarrhea X 3 weeks)  The cramping started 8/28/23.   She then felt sick to her stomach.   She vomited once.  Then she had large BM and then diarrhea quit a few times over a few  hours and seemed better.   She did not have blood in her stool.    Then over the weekend this happened again.    It lasted each time from start to finish about 12 hours.   It comes fast and then goes.   She does not know what she might have eaten to cause it.   She wondered about beets maybe.    Since vaginal bx in July she has had ache in right lower quadrant but that seems unrelated.             9/18/2023     2:55 PM   Additional Questions   Roomed by Shelbi   Accompanied by Self       History of Present Illness       Reason for visit:  Abdominal pain cramping diarrhea  Symptom onset:  3-4 weeks " ago  Symptoms include:  Pain diarrhea cramping constiparion  Symptom intensity:  Severe  Symptom progression:  Worsening  Had these symptoms before:  Yes  Has tried/received treatment for these symptoms:  No  What makes it worse:  I hope not  What makes it better:  No    She eats 2-3 servings of fruits and vegetables daily.She consumes 1 sweetened beverage(s) daily.She exercises with enough effort to increase her heart rate 20 to 29 minutes per day.  She exercises with enough effort to increase her heart rate 4 days per week. She is missing 1 dose(s) of medications per week.       Concern - Abdominal pain   Onset: 3 weeks  Description: Severe pain, with diarrhea which starts with constipation and cramps  Intensity: severe  Progression of Symptoms:  same and episodic  Accompanying Signs & Symptoms:   Previous history of similar problem:   Precipitating factors:        Worsened by:   Alleviating factors:        Improved by:   Therapies tried and outcome: acetaminophen with very mild relief but pain still persistent       Past Medical History:   Diagnosis Date    Allergic rhinitis, cause unspecified     Allergic rhinitis    Depressive disorder 2000    Depressive disorder, not elsewhere classified     Essential hypertension 04/17/2019    Rib fractures 06/2023    trauma - fall from horse    Subdural hematoma (H) 06/2023    fell off horse       Past Surgical History:   Procedure Laterality Date    APPENDECTOMY  05/2011    BIOPSY  2011    breast biopsy - benign    BIOPSY VAGINAL  07/2023    benign    COLONOSCOPY  11/11/2019    repeat in 2024       MEDICATIONS:  Current Outpatient Medications   Medication    amLODIPine (NORVASC) 5 MG tablet    buPROPion (WELLBUTRIN XL) 150 MG 24 hr tablet    buPROPion (WELLBUTRIN XL) 300 MG 24 hr tablet    escitalopram (LEXAPRO) 20 MG tablet    multivitamin w/minerals (THERA-VIT-M) tablet    Omega-3 Fatty Acids (OMEGA-3 FISH OIL PO)     No current facility-administered medications for this  "visit.       SOCIAL HISTORY:  Social History     Tobacco Use    Smoking status: Never    Smokeless tobacco: Never   Substance Use Topics    Alcohol use: No       Family History   Problem Relation Age of Onset    Cancer Father         Lung Cancer.  age 62    Breast Cancer Sister 59    Mental Illness Brother         Maybe autism    C.A.D. Maternal Grandmother         MI age 92    Diabetes Maternal Grandmother     Cerebrovascular Disease Maternal Grandfather         age 82    Osteoporosis Paternal Grandmother          in her 80's    Breast Cancer Maternal Aunt     Cancer - colorectal Paternal Aunt     Breast Cancer Other              Review of Systems   Constitutional, HEENT, cardiovascular, pulmonary, gi and gu systems are negative, except as otherwise noted.      Objective    /77 (BP Location: Right arm, Patient Position: Sitting, Cuff Size: Adult Regular)   Pulse 91   Temp 98.1  F (36.7  C) (Oral)   Resp 17   Ht 1.613 m (5' 3.5\")   Wt 76.2 kg (168 lb)   LMP 2010   SpO2 97%   BMI 29.29 kg/m    Body mass index is 29.29 kg/m .  Physical Exam   GENERAL: healthy, alert and no distress  HENT: ear canals and TM's normal, nose and mouth without ulcers or lesions  NECK: no adenopathy, no asymmetry, masses, or scars and thyroid normal to palpation  RESP: lungs clear to auscultation - no rales, rhonchi or wheezes  CV: regular rate and rhythm, normal S1 S2, no S3 or S4, no murmur, click or rub, no peripheral edema and peripheral pulses strong  ABDOMEN: soft, nontender, no hepatosplenomegaly, no masses and bowel sounds normal  MS: no gross musculoskeletal defects noted, no edema  SKIN: no suspicious lesions or rashes  NEURO: Normal strength and tone, mentation intact and speech normal  PSYCH: mentation appears normal, affect normal/bright  LYMPH: no cervical, supraclavicular, axillary, or inguinal adenopathy    Office Visit on 2023   Component Date Value Ref Range Status    Case Report " 07/25/2023    Final                    Value:Surgical Pathology Report                         Case: [id number]                                  Authorizing Provider:  Nila Macdonald MD       Collected:           07/25/2023 03:42 PM          Ordering Location:     [facility]  Received:            07/25/2023 04:11 PM                                 [facility]                                                            Pathologist:           Ben Dewey MD                                                                           Specimen:    Endometrium, EMB                                                                           Final Diagnosis 07/25/2023    Final                    Value:This result contains rich text formatting which cannot be displayed here.    Clinical Information 07/25/2023    Final                    Value:This result contains rich text formatting which cannot be displayed here.    Gross Description 07/25/2023    Final                    Value:This result contains rich text formatting which cannot be displayed here.    Microscopic Description 07/25/2023    Final                    Value:This result contains rich text formatting which cannot be displayed here.    Performing Labs 07/25/2023    Final                    Value:This result contains rich text formatting which cannot be displayed here.

## 2023-09-19 LAB
ALBUMIN SERPL BCG-MCNC: 4.2 G/DL (ref 3.5–5.2)
ALP SERPL-CCNC: 99 U/L (ref 35–104)
ALT SERPL W P-5'-P-CCNC: 14 U/L (ref 0–50)
ANION GAP SERPL CALCULATED.3IONS-SCNC: 9 MMOL/L (ref 7–15)
AST SERPL W P-5'-P-CCNC: 26 U/L (ref 0–45)
BILIRUB SERPL-MCNC: 0.5 MG/DL
BUN SERPL-MCNC: 13.7 MG/DL (ref 8–23)
CALCIUM SERPL-MCNC: 10 MG/DL (ref 8.8–10.2)
CHLORIDE SERPL-SCNC: 103 MMOL/L (ref 98–107)
CREAT SERPL-MCNC: 1 MG/DL (ref 0.51–0.95)
DEPRECATED HCO3 PLAS-SCNC: 25 MMOL/L (ref 22–29)
EGFRCR SERPLBLD CKD-EPI 2021: 63 ML/MIN/1.73M2
GLUCOSE SERPL-MCNC: 83 MG/DL (ref 70–99)
POTASSIUM SERPL-SCNC: 4.4 MMOL/L (ref 3.4–5.3)
PROT SERPL-MCNC: 7.3 G/DL (ref 6.4–8.3)
SODIUM SERPL-SCNC: 137 MMOL/L (ref 136–145)

## 2023-09-20 ENCOUNTER — HOSPITAL ENCOUNTER (OUTPATIENT)
Dept: ULTRASOUND IMAGING | Facility: CLINIC | Age: 63
Discharge: HOME | End: 2023-09-20
Attending: FAMILY MEDICINE | Admitting: FAMILY MEDICINE
Payer: COMMERCIAL

## 2023-09-20 DIAGNOSIS — R10.84 GENERALIZED ABDOMINAL PAIN: ICD-10-CM

## 2023-09-20 PROCEDURE — 76705 ECHO EXAM OF ABDOMEN: CPT

## 2023-09-25 ENCOUNTER — HOSPITAL ENCOUNTER (OUTPATIENT)
Dept: CT IMAGING | Facility: CLINIC | Age: 63
Discharge: HOME | End: 2023-09-25
Attending: FAMILY MEDICINE | Admitting: FAMILY MEDICINE
Payer: COMMERCIAL

## 2023-09-25 DIAGNOSIS — R10.84 ABDOMINAL PAIN, GENERALIZED: ICD-10-CM

## 2023-09-25 PROBLEM — D25.9 FIBROID UTERUS: Status: ACTIVE | Noted: 2023-09-01

## 2023-09-25 PROCEDURE — 250N000009 HC RX 250: Performed by: FAMILY MEDICINE

## 2023-09-25 PROCEDURE — 250N000011 HC RX IP 250 OP 636: Performed by: FAMILY MEDICINE

## 2023-09-25 PROCEDURE — 74177 CT ABD & PELVIS W/CONTRAST: CPT

## 2023-09-25 RX ORDER — IOPAMIDOL 755 MG/ML
82 INJECTION, SOLUTION INTRAVASCULAR ONCE
Status: COMPLETED | OUTPATIENT
Start: 2023-09-25 | End: 2023-09-25

## 2023-09-25 RX ADMIN — SODIUM CHLORIDE 63 ML: 9 INJECTION, SOLUTION INTRAVENOUS at 07:45

## 2023-09-25 RX ADMIN — IOPAMIDOL 82 ML: 755 INJECTION, SOLUTION INTRAVENOUS at 07:45

## 2023-10-17 ENCOUNTER — VIRTUAL VISIT (OUTPATIENT)
Dept: FAMILY MEDICINE | Facility: CLINIC | Age: 63
End: 2023-10-17
Payer: COMMERCIAL

## 2023-10-17 DIAGNOSIS — R19.7 DIARRHEA, UNSPECIFIED TYPE: Primary | ICD-10-CM

## 2023-10-17 DIAGNOSIS — K21.00 GASTROESOPHAGEAL REFLUX DISEASE WITH ESOPHAGITIS, UNSPECIFIED WHETHER HEMORRHAGE: ICD-10-CM

## 2023-10-17 DIAGNOSIS — K44.9 HIATAL HERNIA: ICD-10-CM

## 2023-10-17 DIAGNOSIS — R10.84 ABDOMINAL PAIN, GENERALIZED: ICD-10-CM

## 2023-10-17 DIAGNOSIS — I10 ESSENTIAL HYPERTENSION: ICD-10-CM

## 2023-10-17 PROCEDURE — 99214 OFFICE O/P EST MOD 30 MIN: CPT | Mod: VID | Performed by: FAMILY MEDICINE

## 2023-10-17 RX ORDER — FAMOTIDINE 20 MG/1
20 TABLET, FILM COATED ORAL 2 TIMES DAILY
COMMUNITY
Start: 2023-10-17 | End: 2024-06-20

## 2023-10-17 RX ORDER — AMLODIPINE BESYLATE 5 MG/1
5 TABLET ORAL EVERY EVENING
Start: 2023-10-17 | End: 2023-12-28

## 2023-10-17 NOTE — PROGRESS NOTES
Checked in using Mediam[employer]  Sanjuanita is a 63 year old who is being evaluated via a billable video visit.      How would you like to obtain your AVS? Credport  If the video visit is dropped, the invitation should be resent by: Text to cell phone: [phone]  Will anyone else be joining your video visit? No        Assessment & Plan     Diarrhea, unspecified type  Could be infectious vs inflammatory vs malignancy - last colonoscopy 4 years ago and was okay   - C. difficile Toxin B PCR with reflex to C. difficile Antigen and Toxins A/B EIA  - Enteric Bacteria and Virus Panel by [facility] Stool  - Fecal Lactoferrin    Gastroesophageal reflux disease with esophagitis, unspecified whether hemorrhage  Uses prn  This can be due to hiatal hernia  Handout on the above diagnosis was given to the patient and discussed   The thickening on July CT could be due to this - bring up with GI  - famotidine (PEPCID) 20 MG tablet    Hiatal hernia  Not found on recent CT but recent was on of abdomin and pelvis  The thickening on July CT could be due to this - bring up with GI  - famotidine (PEPCID) 20 MG tablet    Abdominal pain, generalized  See above  - famotidine (PEPCID) 20 MG tablet      Review of prior external note(s) from - ER   31 minutes spent by me on the date of the encounter doing chart review, history and exam, documentation and further activities per the note       CONSULTATION/REFERRAL to GI - appt 11/1/23  See Patient Instructions    Gerda Chacon MD  [facility]   Sanjuanita is a 63 year old, presenting for the following health issues:  Consult (Review CT findings from 09- / 6/17/2023 for esophagus )  She began to have abdominal pain the end of August.   It would  come in bouts of cramping and pain and nausea and then have diarrhea and one time vomiting and then resolve after 12 hours or so.    She had labs done at 9/18/23 visit which were all good.   She then had ultrasound and then  abdominal CT and they did not find the cause.   Incidental finding of hiatal hernia, uterine fibroid (very small) and kidney stone (small and nonobstructing) were discussed but not felt to be contributing.   Since her last visit the last 10 days she has had daily diarrhea now.     It is up to 3-4 times per day and loose.   It is accompanied by abdominal cramping.   She has taken to bland diet to try and help and simethicone.   She uses famotidine for occasional heart burn.   She took imodium yesterday am for her first time and now has not had BM since.    She feels the cramping mostly in her right lower quadrant and when having BM it seems worse.     She also mentions that when she had CT scan of chest in ER last summer, they made comment on thickening on esophagus that could be inflammation due to reflux but could be cancer.           10/17/2023    11:00 AM   Additional Questions   Roomed by walter bauer       History of Present Illness       Reason for visit:  Pelvic CT results    She eats 2-3 servings of fruits and vegetables daily.She consumes 1 sweetened beverage(s) daily.She exercises with enough effort to increase her heart rate 20 to 29 minutes per day.  She exercises with enough effort to increase her heart rate 4 days per week. She is missing 1 dose(s) of medications per week.  She is not taking prescribed medications regularly due to other.       Past Medical History:   Diagnosis Date    Allergic rhinitis, cause unspecified     Allergic rhinitis    Depressive disorder 2000    Depressive disorder, not elsewhere classified     Essential hypertension 04/17/2019    Fibroid uterus 09/2023    1.3 cm - found on CT    Hiatal hernia 09/2023    mild found incidentallly n CT    Kidney stone 09/2023    NONobstructing.   In the right upper renal pole is a small nonobstructive 2 mm calculus. In the lower pole is a 3 mm calculus    Rib fractures 06/2023    trauma - fall from horse    Subdural hematoma (H) 06/2023     fell off horse       Past Surgical History:   Procedure Laterality Date    APPENDECTOMY  2011    BIOPSY      breast biopsy - benign    BIOPSY VAGINAL  2023    benign    COLONOSCOPY  2019    repeat in        MEDICATIONS:  Current Outpatient Medications   Medication    amLODIPine (NORVASC) 5 MG tablet    buPROPion (WELLBUTRIN XL) 150 MG 24 hr tablet    buPROPion (WELLBUTRIN XL) 300 MG 24 hr tablet    escitalopram (LEXAPRO) 20 MG tablet    famotidine (PEPCID) 20 MG tablet    multivitamin w/minerals (THERA-VIT-M) tablet    Omega-3 Fatty Acids (OMEGA-3 FISH OIL PO)     No current facility-administered medications for this visit.       SOCIAL HISTORY:  Social History     Tobacco Use    Smoking status: Never    Smokeless tobacco: Never   Substance Use Topics    Alcohol use: No       Family History   Problem Relation Age of Onset    Cancer Father         Lung Cancer.  age 62    Breast Cancer Sister 59    Mental Illness Brother         Maybe autism    C.A.D. Maternal Grandmother         MI age 92    Diabetes Maternal Grandmother     Cerebrovascular Disease Maternal Grandfather         age 82    Osteoporosis Paternal Grandmother          in her 80's    Breast Cancer Maternal Aunt     Cancer - colorectal Paternal Aunt     Breast Cancer Other            Review of Systems   Constitutional, HEENT, cardiovascular, pulmonary, gi and gu systems are negative, except as otherwise noted.      Objective           Vitals:  No vitals were obtained today due to virtual visit.    Physical Exam   GENERAL: Healthy, alert and no distress  EYES: Eyes grossly normal to inspection.  No discharge or erythema, or obvious scleral/conjunctival abnormalities.  RESP: No audible wheeze, cough, or visible cyanosis.  No visible retractions or increased work of breathing.    SKIN: Visible skin clear. No significant rash, abnormal pigmentation or lesions.  NEURO: Cranial nerves grossly intact.  Mentation and speech appropriate  for age.  PSYCH: Mentation appears normal, affect normal/bright, judgement and insight intact, normal speech and appearance well-groomed.    Office Visit on 09/18/2023   Component Date Value Ref Range Status    Sodium 09/18/2023 137  136 - 145 mmol/L Final    Potassium 09/18/2023 4.4  3.4 - 5.3 mmol/L Final    Chloride 09/18/2023 103  98 - 107 mmol/L Final    Carbon Dioxide (CO2) 09/18/2023 25  22 - 29 mmol/L Final    Anion Gap 09/18/2023 9  7 - 15 mmol/L Final    Urea Nitrogen 09/18/2023 13.7  8.0 - 23.0 mg/dL Final    Creatinine 09/18/2023 1.00 (H)  0.51 - 0.95 mg/dL Final    Calcium 09/18/2023 10.0  8.8 - 10.2 mg/dL Final    Glucose 09/18/2023 83  70 - 99 mg/dL Final    Alkaline Phosphatase 09/18/2023 99  35 - 104 U/L Final    AST 09/18/2023 26  0 - 45 U/L Final    Reference intervals for this test were updated on 6/12/2023 to more accurately reflect our healthy population. There may be differences in the flagging of prior results with similar values performed with this method. Interpretation of those prior results can be made in the context of the updated reference intervals.    ALT 09/18/2023 14  0 - 50 U/L Final    Reference intervals for this test were updated on 6/12/2023 to more accurately reflect our healthy population. There may be differences in the flagging of prior results with similar values performed with this method. Interpretation of those prior results can be made in the context of the updated reference intervals.      Protein Total 09/18/2023 7.3  6.4 - 8.3 g/dL Final    Albumin 09/18/2023 4.2  3.5 - 5.2 g/dL Final    Bilirubin Total 09/18/2023 0.5  <=1.2 mg/dL Final    GFR Estimate 09/18/2023 63  >60 mL/min/1.73m2 Final    WBC Count 09/18/2023 5.4  4.0 - 11.0 10e3/uL Final    RBC Count 09/18/2023 4.16  3.80 - 5.20 10e6/uL Final    Hemoglobin 09/18/2023 13.2  11.7 - 15.7 g/dL Final    Hematocrit 09/18/2023 37.5  35.0 - 47.0 % Final    MCV 09/18/2023 90  78 - 100 fL Final    MCH 09/18/2023 31.7   26.5 - 33.0 pg Final    MCHC 09/18/2023 35.2  31.5 - 36.5 g/dL Final    RDW 09/18/2023 12.0  10.0 - 15.0 % Final    Platelet Count 09/18/2023 228  150 - 450 10e3/uL Final    Color Urine 09/18/2023 Yellow  Colorless, Straw, Light Yellow, Yellow Final    Appearance Urine 09/18/2023 Clear  Clear Final    Glucose Urine 09/18/2023 Negative  Negative mg/dL Final    Bilirubin Urine 09/18/2023 Negative  Negative Final    Ketones Urine 09/18/2023 Trace (A)  Negative mg/dL Final    Specific Gravity Urine 09/18/2023 1.020  1.003 - 1.035 Final    Blood Urine 09/18/2023 Negative  Negative Final    pH Urine 09/18/2023 5.5  5.0 - 7.0 Final    Protein Albumin Urine 09/18/2023 Negative  Negative mg/dL Final    Urobilinogen Urine 09/18/2023 0.2  0.2, 1.0 E.U./dL Final    Nitrite Urine 09/18/2023 Negative  Negative Final    Leukocyte Esterase Urine 09/18/2023 Negative  Negative Final     CT scan and ultrasound reviewed from last month and July chest Ct reviewed            Video-Visit Details    Type of service:  Video Visit     Originating Location (pt. Location): Home    Distant Location (provider location):  On-site  Platform used for Video Visit: Mitesh

## 2023-10-21 ENCOUNTER — LAB (OUTPATIENT)
Dept: LAB | Facility: CLINIC | Age: 63
End: 2023-10-21

## 2023-10-21 DIAGNOSIS — R19.7 DIARRHEA, UNSPECIFIED TYPE: ICD-10-CM

## 2023-10-28 PROCEDURE — 87493 C DIFF AMPLIFIED PROBE: CPT

## 2023-10-28 PROCEDURE — 83630 LACTOFERRIN FECAL (QUAL): CPT

## 2023-10-29 LAB
ADV 40+41 DNA STL QL NAA+NON-PROBE: NEGATIVE
ASTRO TYP 1-8 RNA STL QL NAA+NON-PROBE: NEGATIVE
C CAYETANENSIS DNA STL QL NAA+NON-PROBE: NEGATIVE
C DIFF TOX B STL QL: NEGATIVE
CAMPYLOBACTER DNA SPEC NAA+PROBE: NEGATIVE
CRYPTOSP DNA STL QL NAA+NON-PROBE: NEGATIVE
E COLI O157 DNA STL QL NAA+NON-PROBE: NORMAL
E HISTOLYT DNA STL QL NAA+NON-PROBE: NEGATIVE
EAEC ASTA GENE ISLT QL NAA+PROBE: NEGATIVE
EC STX1+STX2 GENES STL QL NAA+NON-PROBE: NEGATIVE
EPEC EAE GENE STL QL NAA+NON-PROBE: NEGATIVE
ETEC LTA+ST1A+ST1B TOX ST NAA+NON-PROBE: NEGATIVE
G LAMBLIA DNA STL QL NAA+NON-PROBE: NEGATIVE
LACTOFERRIN STL QL IA: NEGATIVE
NOROVIRUS GI+II RNA STL QL NAA+NON-PROBE: NEGATIVE
P SHIGELLOIDES DNA STL QL NAA+NON-PROBE: NEGATIVE
RVA RNA STL QL NAA+NON-PROBE: NEGATIVE
SALMONELLA SP RPOD STL QL NAA+PROBE: NEGATIVE
SAPO I+II+IV+V RNA STL QL NAA+NON-PROBE: NEGATIVE
SHIGELLA SP+EIEC IPAH ST NAA+NON-PROBE: NEGATIVE
V CHOLERAE DNA SPEC QL NAA+PROBE: NEGATIVE
VIBRIO DNA SPEC NAA+PROBE: NEGATIVE
Y ENTEROCOL DNA STL QL NAA+PROBE: NEGATIVE

## 2023-10-31 NOTE — TELEPHONE ENCOUNTER
REFERRAL INFORMATION:  Referring Provider:  Gerda Chacon MD  Referring Clinic:  [facility]  Reason for Visit/Diagnosis: Generalized abdominal pain [R10.84]     FUTURE VISIT INFORMATION:  Appointment Date: 11/1/23  Appointment Time: 11:45 AM     NOTES STATUS DETAILS   OFFICE NOTE from Referring Provider Internal Gerda Chacon MD - [facility]:   10/17/23, 9/18/23 - OV    OPERATIVE REPORT Internal [facility]:   5/2/11 - LAPAROSCOPIC APPENDECTOMY - Antonio Rudd MD - [facility]    MEDICATION LIST Internal         COLONOSCOPY [facility]:   11/11/19 - Colonoscopy    [facility]:   6/1/09 - Colonoscopy    STOOL TESTING Internal [facility]:   10/28/23 -Fecal Lactoferrin; Enteric Bacteria; C. Diff Toxin    PERTINENT LABS Internal [facility]:   9/18/12 - CMP; CBC w/ Diff   IMAGING (CT, MRI, EGD, MRCP, Small Bowel Follow Through/SBT, MR/CT Enterography) Internal [facility]:   CT Abdomen Pelvis - 9/25/23  US Abdomen - 9/20/23  US Pevlic Transvaginal - 7/19/23  XR Chest - 6/18/23  CT Chest - 6/17/23

## 2023-11-01 ENCOUNTER — VIRTUAL VISIT (OUTPATIENT)
Dept: GASTROENTEROLOGY | Facility: CLINIC | Age: 63
End: 2023-11-01
Attending: FAMILY MEDICINE
Payer: COMMERCIAL

## 2023-11-01 ENCOUNTER — PRE VISIT (OUTPATIENT)
Dept: GASTROENTEROLOGY | Facility: CLINIC | Age: 63
End: 2023-11-01

## 2023-11-01 VITALS — BODY MASS INDEX: 28.68 KG/M2 | HEIGHT: 64 IN | WEIGHT: 168 LBS

## 2023-11-01 DIAGNOSIS — R93.89 ABNORMAL FINDING ON CT SCAN: Primary | ICD-10-CM

## 2023-11-01 DIAGNOSIS — R10.84 GENERALIZED ABDOMINAL PAIN: ICD-10-CM

## 2023-11-01 PROCEDURE — 99205 OFFICE O/P NEW HI 60 MIN: CPT | Mod: VID | Performed by: PHYSICIAN ASSISTANT

## 2023-11-01 ASSESSMENT — PAIN SCALES - GENERAL: PAINLEVEL: MILD PAIN (2)

## 2023-11-01 NOTE — PATIENT INSTRUCTIONS
"It was a pleasure taking care of you today.  I've included a brief summary of our discussion and care plan from today's visit below.  Please review this information with your primary care provider.  _______________________________________________________________________    My recommendations are summarized as follows:    1. Start MiraLAX 1 capful daily. This can be adjusted up to 3 capfuls daily, with goal of soft, easy to pass stool.  2. After using MiraLAX for 1-2 weeks, add in a powdered fiber supplement. Please refer to fiber guide, below, for further details.  3. Increase dietary fiber as able, with goal at least 25 grams of fiber per day. Oats, apples, pears, prunes, shannon seeds, berries, and whole grains are all good sources of fiber.  4. Try to stay hydrated with at least 64 ounces of fluid per day  5.  Order placed for upper endoscopy. The endoscopy scheduling team will typically call within 2-5 business days, or you can call [phone], option #2 to schedule.     Fiber Guide:  -- Recommend starting supplementation with a powdered soluble fiber. When used on a daily basis, this can help regulate the consistency of your stools. Generally, the powdered variations often work best. There are many different varieties out there, with the following general recommendations:  1) Metamucil (psyllium) and Citrucel. These are \"gel-forming\" fibers, which make a gel-like substance when mixed water. Make sure to mix well and drink promptly. You can start with 1-2 teaspoons per day, with goal to gradually increase to 1 tablespoon daily. You can increase up to 1 tablespoon three times daily if needed. It is important to stay well-hydrated with use of fiber supplementation and to make sure that the fiber powder is well mixed with water as directed on the label. You may experience some bloating with initiation of fiber, which will improve over the first few weeks. A good trial to evaluate the effect is 3-6 months. Of note, " many of the fiber products contain artificial sweeteners, which can cause bloating, gas, and diarrhea in those who may be sensitive to artificial sweeteners. If this is the case, recommend trying Metamucil Premium Blend (with Stevia), Metamucil 4-in-1 without Added Sweeteners, or Bellway (sweetened with Monk fruit extract).  2)  If you cannot tolerate the gel-forming fibers, or would prefer to use a product without added sweeteners, you can try Benefiber. The dosing is the same, with the recommendation to start with 1-2 teaspoons daily and gradually increase to 1 tablespoon daily, up to three times daily if needed.  3)You can also use plain, pure psyllium husk powder. This has no additives. For this, start with 1/2 teaspoon daily at first, then gradually increase to 1-2 teaspoons daily.    Return to GI Clinic in 2-3 months to review your progress.    ______________________________________________________________________    How do I schedule labs, imaging studies, or procedures that were ordered in clinic today?     Labs: To schedule lab appointment at the [facility], use my chart or call [phone]. If you have a [place] lab closer to home where you are regularly seen you can give them a call.     Procedures: If a colonoscopy, upper endoscopy, breath test, esophageal manometry, or pH impedence was ordered today, our endoscopy team will call you to schedule this. If you have not heard from our endoscopy team within a week, please call [phone] option 2 to schedule.     Imaging Studies: If you were scheduled for a CT scan, X-ray, MRI, ultrasound, HIDA scan or other imaging study, please call [phone] to have this scheduled.     Referral: If a referral to another specialty was ordered, expect a phone call or follow instructions above. If you have not heard from anyone regarding your referral in a week, please call our clinic to check the status.     Who do I call with any questions after my  visit?  Please be in touch if there are any further questions that arise following today's visit.  There are multiple ways to contact your gastroenterology care team.      During business hours, you may reach a Gastroenterology nurse at [phone]    To schedule or reschedule an appointment, please call [phone].     You can always send a secure message through [employer].  [employer] messages are answered by your nurse or doctor typically within 24 hours.  Please allow extra time on weekends and holidays.      For urgent/emergent questions after business hours, you may reach the on-call GI Fellow by contacting the [facility]  at [phone].     How will I get the results of any tests ordered?    You will receive all of your results.  If you have signed up for [employer], any tests ordered at your visit will be available to you after your physician reviews them.  Typically this takes 1-2 weeks.  If there are urgent results that require a change in your care plan, your physician or nurse will call you to discuss the next steps.      What is [employer]?  [employer] is a secure way for you to access all of your healthcare records from the [facility].  It is a web based computer program, so you can sign on to it from any location.  It also allows you to send secure messages to your care team.  I recommend signing up for [employer] access if you have not already done so and are comfortable with using a computer.      How to I schedule a follow-up visit?  If you did not schedule a follow-up visit today, please call [phone] to schedule a follow-up office visit.      Sincerely,    Ginette Santiago PA-C  Division of Gastroenterology, Hepatology & Nutrition  [facility]

## 2023-11-01 NOTE — PROGRESS NOTES
Virtual Visit Details    Type of service:  Video Visit     Originating Location (pt. Location): Home    Distant Location (provider location):  Off-site  Platform used for Video Visit: Mitesh

## 2023-11-01 NOTE — LETTER
11/1/2023         RE: Sanjuanita Saul  [address]  [place] [address]        Dear Colleague,    Thank you for referring your patient, Sanjuanita Saul, to the [facility] [place]. Please see a copy of my visit note below.    Virtual Visit Details    Type of service:  Video Visit     Originating Location (pt. Location): Home    Distant Location (provider location):  Off-site  Platform used for Video Visit: [facility]      GI CLINIC VISIT    CC/REFERRING PROVIDER: Gerda Chacon    HPI: 63 year old female with PMH of depression, hypertension, uterine fibroids presenting to GI clinic for abdominal pain    Sanjuanita states she has been experiencing episodes of abdominal pain associated with a change in habits, with first episode occurring in late August 2023. Initially, she was noting the episodes every 2-3 weeks, nand now occurring around once per week. At this time, she describes having osnet of lower abdominal/pelvic pain around the mid-line, appreciated as a twisting type pain or gas pain. Superior to this, there may be some cramping, otherwise the pain is non-migratory. The pain lasts for 3-4 hours. During this time, she will try to have a BM, without success. Once able to have ab oewl movement, the pain is relieved. She will typically have a hard, dry stool, with progression to loose stools over the course of four bowel movements, on average. She then will not have another bowel movement for six days, and then the episode will repeat it self.  No BRBPR, melena, or unintentional weight loss.     Prior to August, she states she would typically have a bowel movement every 3 day, which was described as hard stool, and sometimes where it could be difficult to pass, with associated straining and sense of incomplete evacuation For this constipation pattern, she had tried MiraLAX 1 capful as needed.     GI ROS notable for history of GERD symptoms that has been well-controlled following  "intentional weight loss several years ago, and Pepcid. This has increased recently. She endorses hiccups and belching. She had a CT in June which showed esophageal thickening, to which the radiologist expressed concern for malignancy.    She states that she has had lower RLQ pain.   Work-up thus far through PCP has included:  - Laboratory testing with unrevealing CBC, CMP, TSH  - Stool testing 10/28/2023 with negative C diff, enteric panel, and fecal lactoferrin  - CT AP (9/25/2023) - unrevealing outside of moderate amount of formed stool in colon and \"mild\" hiatal hernia.  - RUQ US (9/20/2023) unremarkable  - TVUS (7/2023) -endometrial cavity with anechoic area with fluid in it, thin endometrial stripe; consider endometrial sampling to rule out endometrial neoplasm  - Colonoscopy (2009) - normal exam, repeat 10 years  - Colonoscopy (2019) - diverticulosis, otherwise unremarkable    FHx- paternal aunt with colon cancer    ROS: 10pt ROS performed and otherwise negative.    PAST MEDICAL HISTORY:  Past Medical History:   Diagnosis Date    Allergic rhinitis, cause unspecified     Allergic rhinitis    Depressive disorder 2000    Depressive disorder, not elsewhere classified     Essential hypertension 04/17/2019    Fibroid uterus 09/2023    1.3 cm - found on CT    Hiatal hernia 09/2023    mild found incidentallly n CT    Kidney stone 09/2023    NONobstructing.   In the right upper renal pole is a small nonobstructive 2 mm calculus. In the lower pole is a 3 mm calculus    Rib fractures 06/2023    trauma - fall from horse    Subdural hematoma (H) 06/2023    fell off horse       PREVIOUS ABDOMINAL/GYNECOLOGIC SURGERIES:    Past Surgical History:   Procedure Laterality Date    APPENDECTOMY  05/2011    BIOPSY  2011    breast biopsy - benign    BIOPSY VAGINAL  07/2023    benign    COLONOSCOPY  11/11/2019    repeat in 2024         PERTINENT MEDICATIONS:  Current Outpatient Medications   Medication Sig Dispense Refill    " amLODIPine (NORVASC) 5 MG tablet Take 1 tablet (5 mg) by mouth every evening      buPROPion (WELLBUTRIN XL) 150 MG 24 hr tablet Take 1 tablet (150 mg) by mouth every morning In combination with 300mg tab for total dose 450mg 90 tablet 3    buPROPion (WELLBUTRIN XL) 300 MG 24 hr tablet Take 1 tablet (300 mg) by mouth every morning 90 tablet 3    escitalopram (LEXAPRO) 20 MG tablet Take 1 tablet (20 mg) by mouth daily 90 tablet 3    famotidine (PEPCID) 20 MG tablet Take 1 tablet (20 mg) by mouth 2 times daily      multivitamin w/minerals (THERA-VIT-M) tablet Take 1 tablet by mouth every evening      Omega-3 Fatty Acids (OMEGA-3 FISH OIL PO) Take 1 g by mouth every evening         No other NSAIDs reported by patient.  No other OTC/herbal/supplements reported by patient.    SOCIAL HISTORY:  Social History     Socioeconomic History    Marital status: Single     Spouse name: Not on file    Number of children: Not on file    Years of education: Not on file    Highest education level: Not on file   Occupational History    Occupation: [employer]    Occupation: Nursing student   Tobacco Use    Smoking status: Never    Smokeless tobacco: Never   Vaping Use    Vaping Use: Never used   Substance and Sexual Activity    Alcohol use: No    Drug use: No    Sexual activity: Never     Comment: never sexually active   Other Topics Concern    Parent/sibling w/ CABG, MI or angioplasty before 65F 55M? No   Social History Narrative    previous pap screens normal, last pap screen at physical not completed due to patient discomfort     Social Determinants of Health     Financial Resource Strain: Low Risk  (10/11/2023)    Financial Resource Strain     Within the past 12 months, have you or your family members you live with been unable to get utilities (heat, electricity) when it was really needed?: No   Food Insecurity: Low Risk  (10/11/2023)    Food Insecurity     Within the past 12 months, did you worry that your food would run out before  "you got money to buy more?: No     Within the past 12 months, did the food you bought just not last and you didn t have money to get more?: No   Transportation Needs: Low Risk  (10/11/2023)    Transportation Needs     Within the past 12 months, has lack of transportation kept you from medical appointments, getting your medicines, non-medical meetings or appointments, work, or from getting things that you need?: No   Physical Activity: Sufficiently Active (2023)    Exercise Vital Sign     Days of Exercise per Week: 5 days     Minutes of Exercise per Session: 30 min   Stress: No Stress Concern Present (2023)    [employer] [place] [employer] - Occupational Stress Questionnaire     Feeling of Stress : Not at all   Social Connections: Socially Isolated (2023)    Social Connection and Isolation Panel [NHANES]     Frequency of Communication with Friends and Family: Never     Frequency of Social Gatherings with Friends and Family: Once a week     Attends Jew Services: Never     Active Member of Clubs or Organizations: No     Attends Club or Organization Meetings: Not on file     Marital Status: Never    Interpersonal Safety: Not on file   Housing Stability: Low Risk  (10/11/2023)    Housing Stability     Do you have housing? : Yes     Are you worried about losing your housing?: No       FAMILY HISTORY:  Family History   Problem Relation Age of Onset    Cancer Father         Lung Cancer.  age 62    Breast Cancer Sister 59    Mental Illness Brother         Maybe autism    C.A.D. Maternal Grandmother         MI age 92    Diabetes Maternal Grandmother     Cerebrovascular Disease Maternal Grandfather         age 82    Osteoporosis Paternal Grandmother          in her 80's    Breast Cancer Maternal Aunt     Cancer - colorectal Paternal Aunt     Breast Cancer Other        PHYSICAL EXAMINATION:  Vitals reviewed  Ht 1.613 m (5' 3.5\")   Wt 76.2 kg (168 lb)   LMP 2010   BMI 29.29 " kg/m    Video physical exam  General: Patient appears well in no acute distress.   Skin: No visualized rash or lesions on visualized skin  Eyes: EOMI, no erythema, sclera icterus or discharge noted  Resp: Appears to be breathing comfortably without accessory muscle usage, speaking in full sentences, no cough  MSK: Appears to have normal range of motion based on visualized movements  Neurologic: No apparent tremors, facial movements symmetric  Psych: affect normal, alert and oriented    The rest of a comprehensive physical examination is deferred due to PHE (public health emergency) video restrictions      PERTINENT STUDIES Reviewed in EMR    ASSESSMENT/PLAN:    # Abdominal pain  # Constipation  Sanjuanita is presenting with cyclical episodes of abdominal pain, in the context of chronic constipation. Once able to evacuate stool, the abdominal pain resolves. No alarm features. Abdominal US, CT CAP, laboratory testing, and stool tests have all been unrevealing outside of a moderate stool burden on CT. She had an unrevealing colonoscopy in 2019. We discussed that her symptoms seem most likely secondary to constipation with overflow pattern. We mutually formulated the following plan:    1) Start MiraLAX 1 capful daily. This can be adjusted up to 3 capfuls daily, with goal of soft, easy to pass stool.  2) After using MiraLAX for 1-2 weeks, add in a powdered fiber supplement. Please refer to fiber guide (in AVS) for further details.  3) Increase dietary fiber as able, with goal at least 25 grams of fiber per day. Oats, apples, pears, prunes, shannon seeds, berries, and whole grains are all good sources of fiber.  4) Try to stay hydrated with at least 64 ounces of fluid per day  5) We mutually agreed to hold off colonoscopy at this time given previously normal exam and lack of any alarm features. Should symptoms fail to improve with conservative measures, above, consider proceeding with colonoscopy.  6) Pending symptom improvement,  could consider trial of secretagogue (ie Linzess), referral to pelvic floor center    # History of GERD  # Hiccups  # Esophageal thickening on CT  She was incidentally found to have thickening of the lower esophagus on CT. Radiologist raised possibility of malignancy. She has GERD symptoms at baseline, well controlled until recently, and also endorsing hiccups. No dysphagia. She was interested in proceed with EGD to further evaluate this area.    RTC 2-3 months    Thank you for this consultation. It was a pleasure to participate in the care of this patient; please contact us with any further questions.      68 minutes spent on the date of the encounter doing chart review, review of outside records, review of test results, patient visit, and documentation        Again, thank you for allowing me to participate in the care of your patient.      Sincerely,    Ginette Santiago PA-C

## 2023-11-01 NOTE — PROGRESS NOTES
GI CLINIC VISIT    CC/REFERRING PROVIDER: Gerda Chacon    HPI: 63 year old female with PMH of depression, hypertension, uterine fibroids presenting to GI clinic for abdominal pain    Sanjuanita states she has been experiencing episodes of abdominal pain associated with a change in habits, with first episode occurring in late August 2023. Initially, she was noting the episodes every 2-3 weeks, nand now occurring around once per week. At this time, she describes having osnet of lower abdominal/pelvic pain around the mid-line, appreciated as a twisting type pain or gas pain. Superior to this, there may be some cramping, otherwise the pain is non-migratory. The pain lasts for 3-4 hours. During this time, she will try to have a BM, without success. Once able to have ab oewl movement, the pain is relieved. She will typically have a hard, dry stool, with progression to loose stools over the course of four bowel movements, on average. She then will not have another bowel movement for six days, and then the episode will repeat it self.  No BRBPR, melena, or unintentional weight loss.     Prior to August, she states she would typically have a bowel movement every 3 day, which was described as hard stool, and sometimes where it could be difficult to pass, with associated straining and sense of incomplete evacuation For this constipation pattern, she had tried MiraLAX 1 capful as needed.     GI ROS notable for history of GERD symptoms that has been well-controlled following intentional weight loss several years ago, and Pepcid. This has increased recently. She endorses hiccups and belching. She had a CT in June which showed esophageal thickening, to which the radiologist expressed concern for malignancy.    She states that she has had lower RLQ pain.   Work-up thus far through PCP has included:  - Laboratory testing with unrevealing CBC, CMP, TSH  - Stool testing 10/28/2023 with negative C diff, enteric panel, and fecal  "lactoferrin  - CT AP (9/25/2023) - unrevealing outside of moderate amount of formed stool in colon and \"mild\" hiatal hernia.  - RUQ US (9/20/2023) unremarkable  - TVUS (7/2023) -endometrial cavity with anechoic area with fluid in it, thin endometrial stripe; consider endometrial sampling to rule out endometrial neoplasm  - Colonoscopy (2009) - normal exam, repeat 10 years  - Colonoscopy (2019) - diverticulosis, otherwise unremarkable    FHx- paternal aunt with colon cancer    ROS: 10pt ROS performed and otherwise negative.    PAST MEDICAL HISTORY:  Past Medical History:   Diagnosis Date    Allergic rhinitis, cause unspecified     Allergic rhinitis    Depressive disorder 2000    Depressive disorder, not elsewhere classified     Essential hypertension 04/17/2019    Fibroid uterus 09/2023    1.3 cm - found on CT    Hiatal hernia 09/2023    mild found incidentallly n CT    Kidney stone 09/2023    NONobstructing.   In the right upper renal pole is a small nonobstructive 2 mm calculus. In the lower pole is a 3 mm calculus    Rib fractures 06/2023    trauma - fall from horse    Subdural hematoma (H) 06/2023    fell off horse       PREVIOUS ABDOMINAL/GYNECOLOGIC SURGERIES:    Past Surgical History:   Procedure Laterality Date    APPENDECTOMY  05/2011    BIOPSY  2011    breast biopsy - benign    BIOPSY VAGINAL  07/2023    benign    COLONOSCOPY  11/11/2019    repeat in 2024         PERTINENT MEDICATIONS:  Current Outpatient Medications   Medication Sig Dispense Refill    amLODIPine (NORVASC) 5 MG tablet Take 1 tablet (5 mg) by mouth every evening      buPROPion (WELLBUTRIN XL) 150 MG 24 hr tablet Take 1 tablet (150 mg) by mouth every morning In combination with 300mg tab for total dose 450mg 90 tablet 3    buPROPion (WELLBUTRIN XL) 300 MG 24 hr tablet Take 1 tablet (300 mg) by mouth every morning 90 tablet 3    escitalopram (LEXAPRO) 20 MG tablet Take 1 tablet (20 mg) by mouth daily 90 tablet 3    famotidine (PEPCID) 20 MG " tablet Take 1 tablet (20 mg) by mouth 2 times daily      multivitamin w/minerals (THERA-VIT-M) tablet Take 1 tablet by mouth every evening      Omega-3 Fatty Acids (OMEGA-3 FISH OIL PO) Take 1 g by mouth every evening         No other NSAIDs reported by patient.  No other OTC/herbal/supplements reported by patient.    SOCIAL HISTORY:  Social History     Socioeconomic History    Marital status: Single     Spouse name: Not on file    Number of children: Not on file    Years of education: Not on file    Highest education level: Not on file   Occupational History    Occupation: [employer]    Occupation: Nursing student   Tobacco Use    Smoking status: Never    Smokeless tobacco: Never   Vaping Use    Vaping Use: Never used   Substance and Sexual Activity    Alcohol use: No    Drug use: No    Sexual activity: Never     Comment: never sexually active   Other Topics Concern    Parent/sibling w/ CABG, MI or angioplasty before 65F 55M? No   Social History Narrative    previous pap screens normal, last pap screen at physical not completed due to patient discomfort     Social Determinants of Health     Financial Resource Strain: Low Risk  (10/11/2023)    Financial Resource Strain     Within the past 12 months, have you or your family members you live with been unable to get utilities (heat, electricity) when it was really needed?: No   Food Insecurity: Low Risk  (10/11/2023)    Food Insecurity     Within the past 12 months, did you worry that your food would run out before you got money to buy more?: No     Within the past 12 months, did the food you bought just not last and you didn t have money to get more?: No   Transportation Needs: Low Risk  (10/11/2023)    Transportation Needs     Within the past 12 months, has lack of transportation kept you from medical appointments, getting your medicines, non-medical meetings or appointments, work, or from getting things that you need?: No   Physical Activity: Sufficiently Active  "(2023)    Exercise Vital Sign     Days of Exercise per Week: 5 days     Minutes of Exercise per Session: 30 min   Stress: No Stress Concern Present (2023)    [employer] [place] [employer] - Occupational Stress Questionnaire     Feeling of Stress : Not at all   Social Connections: Socially Isolated (2023)    Social Connection and Isolation Panel [NHANES]     Frequency of Communication with Friends and Family: Never     Frequency of Social Gatherings with Friends and Family: Once a week     Attends Gnosticism Services: Never     Active Member of Clubs or Organizations: No     Attends Club or Organization Meetings: Not on file     Marital Status: Never    Interpersonal Safety: Not on file   Housing Stability: Low Risk  (10/11/2023)    Housing Stability     Do you have housing? : Yes     Are you worried about losing your housing?: No       FAMILY HISTORY:  Family History   Problem Relation Age of Onset    Cancer Father         Lung Cancer.  age 62    Breast Cancer Sister 59    Mental Illness Brother         Maybe autism    C.A.D. Maternal Grandmother         MI age 92    Diabetes Maternal Grandmother     Cerebrovascular Disease Maternal Grandfather         age 82    Osteoporosis Paternal Grandmother          in her 80's    Breast Cancer Maternal Aunt     Cancer - colorectal Paternal Aunt     Breast Cancer Other        PHYSICAL EXAMINATION:  Vitals reviewed  Ht 1.613 m (5' 3.5\")   Wt 76.2 kg (168 lb)   LMP 2010   BMI 29.29 kg/m    Video physical exam  General: Patient appears well in no acute distress.   Skin: No visualized rash or lesions on visualized skin  Eyes: EOMI, no erythema, sclera icterus or discharge noted  Resp: Appears to be breathing comfortably without accessory muscle usage, speaking in full sentences, no cough  MSK: Appears to have normal range of motion based on visualized movements  Neurologic: No apparent tremors, facial movements symmetric  Psych: affect " normal, alert and oriented    The rest of a comprehensive physical examination is deferred due to PHE (public health emergency) video restrictions      PERTINENT STUDIES Reviewed in EMR    ASSESSMENT/PLAN:    # Abdominal pain  # Constipation  Sanjuanita is presenting with cyclical episodes of abdominal pain, in the context of chronic constipation. Once able to evacuate stool, the abdominal pain resolves. No alarm features. Abdominal US, CT CAP, laboratory testing, and stool tests have all been unrevealing outside of a moderate stool burden on CT. She had an unrevealing colonoscopy in 2019. We discussed that her symptoms seem most likely secondary to constipation with overflow pattern. We mutually formulated the following plan:    1) Start MiraLAX 1 capful daily. This can be adjusted up to 3 capfuls daily, with goal of soft, easy to pass stool.  2) After using MiraLAX for 1-2 weeks, add in a powdered fiber supplement. Please refer to fiber guide (in AVS) for further details.  3) Increase dietary fiber as able, with goal at least 25 grams of fiber per day. Oats, apples, pears, prunes, shannon seeds, berries, and whole grains are all good sources of fiber.  4) Try to stay hydrated with at least 64 ounces of fluid per day  5) We mutually agreed to hold off colonoscopy at this time given previously normal exam and lack of any alarm features. Should symptoms fail to improve with conservative measures, above, consider proceeding with colonoscopy.  6) Pending symptom improvement, could consider trial of secretagogue (ie Linzess), referral to pelvic floor center    # History of GERD  # Hiccups  # Esophageal thickening on CT  She was incidentally found to have thickening of the lower esophagus on CT. Radiologist raised possibility of malignancy. She has GERD symptoms at baseline, well controlled until recently, and also endorsing hiccups. No dysphagia. She was interested in proceed with EGD to further evaluate this area.    RTC 2-3  months    Thank you for this consultation. It was a pleasure to participate in the care of this patient; please contact us with any further questions.    Ginette Santiago PA-C    68 minutes spent on the date of the encounter doing chart review, review of outside records, review of test results, patient visit, and documentation

## 2023-11-01 NOTE — NURSING NOTE
Is the patient currently in the state of [place]? YES    Visit mode:VIDEO    If the visit is dropped, the patient can be reconnected by: VIDEO VISIT: Text to cell phone:   Telephone Information:   Mobile [phone]       Will anyone else be joining the visit? NO  (If patient encounters technical issues they should call [phone]    How would you like to obtain your AVS? MyChart    Are changes needed to the allergy or medication list? No    Reason for visit: Consult    Jessica CERVANTES

## 2023-11-02 ENCOUNTER — TELEPHONE (OUTPATIENT)
Dept: GASTROENTEROLOGY | Facility: CLINIC | Age: 63
End: 2023-11-02
Payer: COMMERCIAL

## 2023-11-02 NOTE — TELEPHONE ENCOUNTER
"Endoscopy Scheduling Screen    Have you had a positive Covid test in the last 14 days?  No    Are you active on MyChart?   Yes    What insurance is in the chart?  Other:  [employer]    Ordering/Referring Provider: SIRENA LUTZ   (If ordering provider performs procedure, schedule with ordering provider unless otherwise instructed. )    BMI: Estimated body mass index is 29.29 kg/m  as calculated from the following:    Height as of 11/1/23: 1.613 m (5' 3.5\").    Weight as of 11/1/23: 76.2 kg (168 lb).     Sedation Ordered  moderate sedation.   If patient BMI > 50 do not schedule in [facility].    If patient BMI > 45 do not schedule at [facility].    Are you taking methadone or Suboxone?  No    Are you taking any prescription medications for pain 3 or more times per week?   No    Do you have a history of malignant hyperthermia or adverse reaction to anesthesia?  No    (Females) Are you currently pregnant?   No     Have you been diagnosed or told you have pulmonary hypertension?   No    Do you have an LVAD?  No    Have you been told you have moderate to severe sleep apnea?  No    Have you been told you have COPD, asthma, or any other lung disease?  No    Do you have any heart conditions?  No     Have you ever had an organ transplant?   No    Have you ever had or are you awaiting a heart or lung transplant?   No    Have you had a stroke or transient ischemic attack (TIA aka \"mini stroke\" in the last 6 months?   No    Have you been diagnosed with or been told you have cirrhosis of the liver?   No    Are you currently on dialysis?   No    Do you need assistance transferring?   No    BMI: Estimated body mass index is 29.29 kg/m  as calculated from the following:    Height as of 11/1/23: 1.613 m (5' 3.5\").    Weight as of 11/1/23: 76.2 kg (168 lb).     Is patients BMI > 40 and scheduling location [facility]?  No    Do you take an injectable medication for weight loss or diabetes (excluding insulin)?  No    Do you take the medication " Naltrexone?  No    Do you take blood thinners?  No       Prep   Are you currently on dialysis or do you have chronic kidney disease?  No    Do you have a diagnosis of diabetes?  No    Do you have a diagnosis of cystic fibrosis (CF)?  No    On a regular basis do you go 3 -5 days between bowel movements?  No    BMI > 40?  No    Preferred Pharmacy:  [id number] - [place], [place] - [address]  [address] [address]  Phone: [phone] Fax: [phone]      Final Scheduling Details   Colonoscopy prep sent?  N/A    Procedure scheduled  Upper endoscopy (EGD)    Surgeon:  LENI     Date of procedure:  11/16/2023     Pre-OP / PAC:   No - Not required for this site.    Location  [facility] - Patient preference.    Sedation   Moderate Sedation - Per order.      Patient Reminders:   You will receive a call from a Nurse to review instructions and health history.  This assessment must be completed prior to your procedure.  Failure to complete the Nurse assessment may result in the procedure being cancelled.      On the day of your procedure, please designate an adult(s) who can drive you home stay with you for the next 24 hours. The medicines used in the exam will make you sleepy. You will not be able to drive.      You cannot take public transportation, ride share services, or non-medical taxi service without a responsible caregiver.  Medical transport services are allowed with the requirement that a responsible caregiver will receive you at your destination.  We require that drivers and caregivers are confirmed prior to your procedure.

## 2023-11-14 RX ORDER — POLYETHYLENE GLYCOL 3350 17 G/17G
1 POWDER, FOR SOLUTION ORAL DAILY
COMMUNITY

## 2023-11-14 RX ORDER — GUAR GUM
1 PACKET (EA) ORAL
COMMUNITY

## 2023-11-16 ENCOUNTER — HOSPITAL ENCOUNTER (OUTPATIENT)
Facility: CLINIC | Age: 63
Discharge: HOME | End: 2023-11-16
Attending: STUDENT IN AN ORGANIZED HEALTH CARE EDUCATION/TRAINING PROGRAM | Admitting: STUDENT IN AN ORGANIZED HEALTH CARE EDUCATION/TRAINING PROGRAM
Payer: COMMERCIAL

## 2023-11-16 VITALS
HEART RATE: 65 BPM | OXYGEN SATURATION: 96 % | RESPIRATION RATE: 16 BRPM | DIASTOLIC BLOOD PRESSURE: 76 MMHG | SYSTOLIC BLOOD PRESSURE: 126 MMHG

## 2023-11-16 LAB — UPPER GI ENDOSCOPY: NORMAL

## 2023-11-16 PROCEDURE — 88342 IMHCHEM/IMCYTCHM 1ST ANTB: CPT | Mod: 26

## 2023-11-16 PROCEDURE — 88305 TISSUE EXAM BY PATHOLOGIST: CPT | Mod: 26

## 2023-11-16 PROCEDURE — G0500 MOD SEDAT ENDO SERVICE >5YRS: HCPCS | Performed by: STUDENT IN AN ORGANIZED HEALTH CARE EDUCATION/TRAINING PROGRAM

## 2023-11-16 PROCEDURE — 88305 TISSUE EXAM BY PATHOLOGIST: CPT | Mod: TC | Performed by: STUDENT IN AN ORGANIZED HEALTH CARE EDUCATION/TRAINING PROGRAM

## 2023-11-16 PROCEDURE — 43239 EGD BIOPSY SINGLE/MULTIPLE: CPT | Performed by: STUDENT IN AN ORGANIZED HEALTH CARE EDUCATION/TRAINING PROGRAM

## 2023-11-16 PROCEDURE — 250N000011 HC RX IP 250 OP 636: Mod: JZ | Performed by: STUDENT IN AN ORGANIZED HEALTH CARE EDUCATION/TRAINING PROGRAM

## 2023-11-16 PROCEDURE — 250N000009 HC RX 250: Performed by: STUDENT IN AN ORGANIZED HEALTH CARE EDUCATION/TRAINING PROGRAM

## 2023-11-16 RX ORDER — ONDANSETRON 2 MG/ML
4 INJECTION INTRAMUSCULAR; INTRAVENOUS EVERY 6 HOURS PRN
Status: DISCONTINUED | OUTPATIENT
Start: 2023-11-16 | End: 2023-11-16 | Stop reason: HOSPADM

## 2023-11-16 RX ORDER — ATROPINE SULFATE 0.1 MG/ML
1 INJECTION INTRAVENOUS
Status: DISCONTINUED | OUTPATIENT
Start: 2023-11-16 | End: 2023-11-16 | Stop reason: HOSPADM

## 2023-11-16 RX ORDER — NALOXONE HYDROCHLORIDE 0.4 MG/ML
0.2 INJECTION, SOLUTION INTRAMUSCULAR; INTRAVENOUS; SUBCUTANEOUS
Status: DISCONTINUED | OUTPATIENT
Start: 2023-11-16 | End: 2023-11-16 | Stop reason: HOSPADM

## 2023-11-16 RX ORDER — NALOXONE HYDROCHLORIDE 0.4 MG/ML
0.4 INJECTION, SOLUTION INTRAMUSCULAR; INTRAVENOUS; SUBCUTANEOUS
Status: DISCONTINUED | OUTPATIENT
Start: 2023-11-16 | End: 2023-11-16 | Stop reason: HOSPADM

## 2023-11-16 RX ORDER — LIDOCAINE 40 MG/G
CREAM TOPICAL
Status: DISCONTINUED | OUTPATIENT
Start: 2023-11-16 | End: 2023-11-16 | Stop reason: HOSPADM

## 2023-11-16 RX ORDER — FLUMAZENIL 0.1 MG/ML
0.2 INJECTION, SOLUTION INTRAVENOUS
Status: DISCONTINUED | OUTPATIENT
Start: 2023-11-16 | End: 2023-11-16 | Stop reason: HOSPADM

## 2023-11-16 RX ORDER — DIPHENHYDRAMINE HYDROCHLORIDE 50 MG/ML
25-50 INJECTION INTRAMUSCULAR; INTRAVENOUS
Status: DISCONTINUED | OUTPATIENT
Start: 2023-11-16 | End: 2023-11-16 | Stop reason: HOSPADM

## 2023-11-16 RX ORDER — EPINEPHRINE 1 MG/ML
0.1 INJECTION, SOLUTION INTRAMUSCULAR; SUBCUTANEOUS
Status: DISCONTINUED | OUTPATIENT
Start: 2023-11-16 | End: 2023-11-16 | Stop reason: HOSPADM

## 2023-11-16 RX ORDER — ONDANSETRON 4 MG/1
4 TABLET, ORALLY DISINTEGRATING ORAL EVERY 6 HOURS PRN
Status: DISCONTINUED | OUTPATIENT
Start: 2023-11-16 | End: 2023-11-16 | Stop reason: HOSPADM

## 2023-11-16 RX ORDER — ONDANSETRON 2 MG/ML
4 INJECTION INTRAMUSCULAR; INTRAVENOUS
Status: DISCONTINUED | OUTPATIENT
Start: 2023-11-16 | End: 2023-11-16 | Stop reason: HOSPADM

## 2023-11-16 RX ORDER — FENTANYL CITRATE 50 UG/ML
50-100 INJECTION, SOLUTION INTRAMUSCULAR; INTRAVENOUS EVERY 5 MIN PRN
Status: DISCONTINUED | OUTPATIENT
Start: 2023-11-16 | End: 2023-11-16 | Stop reason: HOSPADM

## 2023-11-16 RX ORDER — SIMETHICONE 40MG/0.6ML
133 SUSPENSION, DROPS(FINAL DOSAGE FORM)(ML) ORAL
Status: DISCONTINUED | OUTPATIENT
Start: 2023-11-16 | End: 2023-11-16 | Stop reason: HOSPADM

## 2023-11-16 RX ORDER — PROCHLORPERAZINE MALEATE 5 MG
5 TABLET ORAL EVERY 6 HOURS PRN
Status: DISCONTINUED | OUTPATIENT
Start: 2023-11-16 | End: 2023-11-16 | Stop reason: HOSPADM

## 2023-11-16 RX ADMIN — TOPICAL ANESTHETIC 0.5 ML: 200 SPRAY DENTAL; PERIODONTAL at 10:18

## 2023-11-16 RX ADMIN — FENTANYL CITRATE 100 MCG: 50 INJECTION, SOLUTION INTRAMUSCULAR; INTRAVENOUS at 10:17

## 2023-11-16 RX ADMIN — MIDAZOLAM HYDROCHLORIDE 2 MG: 1 INJECTION, SOLUTION INTRAMUSCULAR; INTRAVENOUS at 10:17

## 2023-11-16 ASSESSMENT — ACTIVITIES OF DAILY LIVING (ADL): ADLS_ACUITY_SCORE: 35

## 2023-11-16 NOTE — H&P
[facility] Anesthesia Pre-op History and Physical    Sanjuanita Saul MRN# [id number]   Age: 63 year old YOB: 1960      Date of Surgery: 11/16/23   [facility]      Date of Exam 11/16/2023 Facility (Same day)       Primary care provider: Bernice Calderon         Chief Complaint and/or Reason for Procedure:   GERD   ?thickening of lower esophagus on CT scan in June 2023          Active problem list:     Patient Active Problem List    Diagnosis Date Noted    Fibroid uterus 09/2023     Priority: Medium     1.3 cm - found on CT      Closed fracture of multiple ribs of right side, initial encounter 06/17/2023     Priority: Medium    SDH (subdural hematoma) (H)      Priority: Medium    Essential hypertension 04/17/2019     Priority: Medium    Gastroesophageal reflux disease, esophagitis presence not specified 01/05/2017     Priority: Medium     IMO Regulatory Load OCT 2020      Non morbid obesity, unspecified obesity type 01/05/2017     Priority: Medium    Anxiety 05/08/2013     Priority: Medium    Breast nodule 06/24/2011     Priority: Medium     Left breast,  stereotactic biopsy 6/2011 - fat necrosis      CARDIOVASCULAR SCREENING; LDL GOAL LESS THAN 160 02/10/2010     Priority: Medium    Moderate major depression (H) 06/26/2009     Priority: Medium    Allergic rhinitis 09/16/2005     Priority: Medium     Problem list name updated by automated process. Provider to review              Medications (include herbals and vitamins):     Current Outpatient Medications   Medication Instructions    amLODIPine (NORVASC) 5 mg, Oral, EVERY EVENING    buPROPion (WELLBUTRIN XL) 300 mg, Oral, EVERY MORNING    buPROPion (WELLBUTRIN XL) 150 mg, Oral, EVERY MORNING, In combination with 300mg tab for total dose 450mg    escitalopram (LEXAPRO) 20 mg, Oral, DAILY    famotidine (PEPCID) 20 mg, Oral, 2 TIMES DAILY    Guar Gum (FIBER MODULAR, NUTRISOURCE FIBER,) packet 1 packet, 2 tsp per day      multivitamin w/minerals (THERA-VIT-M) tablet 1 tablet, Oral, EVERY EVENING    Omega-3 Fatty Acids (OMEGA-3 FISH OIL PO) 1 g, Oral, EVERY EVENING    polyethylene glycol (MIRALAX) 17 g packet 1 packet, Oral, DAILY,  Tsp every few days                Allergies:      Allergies   Allergen Reactions    Chlorhexidine Gluconate                Physical Exam:   All vitals have been reviewed  No data found.  Airway assessment:   Mallampatti classification: Class II (visualization of the soft palate, fauces, and uvula)}     Lungs:   No increased work of breathing, good air exchange, clear to auscultation bilaterally     Cardiovascular:   regular rate and rhythm             Lab / Radiology Results:   N/A          Anesthetic risk and/or ASA classification:   Class II     Danis Nick MD

## 2023-11-21 LAB
PATH REPORT.ADDENDUM SPEC: NORMAL
PATH REPORT.COMMENTS IMP SPEC: NORMAL
PATH REPORT.COMMENTS IMP SPEC: NORMAL
PATH REPORT.FINAL DX SPEC: NORMAL
PATH REPORT.GROSS SPEC: NORMAL
PATH REPORT.MICROSCOPIC SPEC OTHER STN: NORMAL
PATH REPORT.RELEVANT HX SPEC: NORMAL
PHOTO IMAGE: NORMAL

## 2023-12-28 ENCOUNTER — MYC REFILL (OUTPATIENT)
Dept: FAMILY MEDICINE | Facility: CLINIC | Age: 63
End: 2023-12-28
Payer: COMMERCIAL

## 2023-12-28 DIAGNOSIS — I10 ESSENTIAL HYPERTENSION: ICD-10-CM

## 2023-12-28 RX ORDER — AMLODIPINE BESYLATE 5 MG/1
5 TABLET ORAL EVERY EVENING
Qty: 90 TABLET | Refills: 3 | Status: SHIPPED | OUTPATIENT
Start: 2023-12-28 | End: 2024-06-20

## 2024-03-01 ENCOUNTER — PATIENT OUTREACH (OUTPATIENT)
Dept: CARE COORDINATION | Facility: CLINIC | Age: 64
End: 2024-03-01
Payer: COMMERCIAL

## 2024-03-29 ENCOUNTER — PATIENT OUTREACH (OUTPATIENT)
Dept: CARE COORDINATION | Facility: CLINIC | Age: 64
End: 2024-03-29
Payer: COMMERCIAL

## 2024-06-08 ENCOUNTER — HEALTH MAINTENANCE LETTER (OUTPATIENT)
Age: 64
End: 2024-06-08

## 2024-06-17 SDOH — HEALTH STABILITY: PHYSICAL HEALTH: ON AVERAGE, HOW MANY MINUTES DO YOU ENGAGE IN EXERCISE AT THIS LEVEL?: 30 MIN

## 2024-06-17 SDOH — HEALTH STABILITY: PHYSICAL HEALTH: ON AVERAGE, HOW MANY DAYS PER WEEK DO YOU ENGAGE IN MODERATE TO STRENUOUS EXERCISE (LIKE A BRISK WALK)?: 5 DAYS

## 2024-06-17 ASSESSMENT — SOCIAL DETERMINANTS OF HEALTH (SDOH): HOW OFTEN DO YOU GET TOGETHER WITH FRIENDS OR RELATIVES?: ONCE A WEEK

## 2024-06-20 ENCOUNTER — OFFICE VISIT (OUTPATIENT)
Dept: PEDIATRICS | Facility: CLINIC | Age: 64
End: 2024-06-20
Attending: FAMILY MEDICINE
Payer: COMMERCIAL

## 2024-06-20 VITALS
WEIGHT: 170.5 LBS | HEIGHT: 63 IN | DIASTOLIC BLOOD PRESSURE: 79 MMHG | RESPIRATION RATE: 16 BRPM | SYSTOLIC BLOOD PRESSURE: 117 MMHG | OXYGEN SATURATION: 97 % | HEART RATE: 85 BPM | TEMPERATURE: 98.5 F | BODY MASS INDEX: 30.21 KG/M2

## 2024-06-20 DIAGNOSIS — F41.9 ANXIETY: ICD-10-CM

## 2024-06-20 DIAGNOSIS — F33.1 MAJOR DEPRESSIVE DISORDER, RECURRENT EPISODE, MODERATE (H): ICD-10-CM

## 2024-06-20 DIAGNOSIS — I10 ESSENTIAL HYPERTENSION: ICD-10-CM

## 2024-06-20 DIAGNOSIS — S06.5XAA SDH (SUBDURAL HEMATOMA) (H): ICD-10-CM

## 2024-06-20 DIAGNOSIS — Z12.31 VISIT FOR SCREENING MAMMOGRAM: ICD-10-CM

## 2024-06-20 DIAGNOSIS — Z00.00 ROUTINE GENERAL MEDICAL EXAMINATION AT A HEALTH CARE FACILITY: Primary | ICD-10-CM

## 2024-06-20 DIAGNOSIS — K21.00 GASTROESOPHAGEAL REFLUX DISEASE WITH ESOPHAGITIS WITHOUT HEMORRHAGE: ICD-10-CM

## 2024-06-20 DIAGNOSIS — Z78.0 ASYMPTOMATIC POSTMENOPAUSAL STATUS: ICD-10-CM

## 2024-06-20 PROCEDURE — 99397 PER PM REEVAL EST PAT 65+ YR: CPT | Mod: 25 | Performed by: PEDIATRICS

## 2024-06-20 PROCEDURE — 90677 PCV20 VACCINE IM: CPT | Performed by: PEDIATRICS

## 2024-06-20 PROCEDURE — 90471 IMMUNIZATION ADMIN: CPT | Performed by: PEDIATRICS

## 2024-06-20 PROCEDURE — 99214 OFFICE O/P EST MOD 30 MIN: CPT | Mod: 25 | Performed by: PEDIATRICS

## 2024-06-20 RX ORDER — ESCITALOPRAM OXALATE 20 MG/1
20 TABLET ORAL DAILY
Qty: 90 TABLET | Refills: 3 | Status: SHIPPED | OUTPATIENT
Start: 2024-06-20

## 2024-06-20 RX ORDER — BUPROPION HYDROCHLORIDE 150 MG/1
150 TABLET ORAL EVERY MORNING
Qty: 90 TABLET | Refills: 3 | Status: SHIPPED | OUTPATIENT
Start: 2024-06-20

## 2024-06-20 RX ORDER — AMLODIPINE BESYLATE 5 MG/1
5 TABLET ORAL EVERY EVENING
Qty: 90 TABLET | Refills: 3 | Status: SHIPPED | OUTPATIENT
Start: 2024-06-20

## 2024-06-20 RX ORDER — BUPROPION HYDROCHLORIDE 300 MG/1
300 TABLET ORAL EVERY MORNING
Qty: 90 TABLET | Refills: 3 | Status: SHIPPED | OUTPATIENT
Start: 2024-06-20

## 2024-06-20 ASSESSMENT — PATIENT HEALTH QUESTIONNAIRE - PHQ9
10. IF YOU CHECKED OFF ANY PROBLEMS, HOW DIFFICULT HAVE THESE PROBLEMS MADE IT FOR YOU TO DO YOUR WORK, TAKE CARE OF THINGS AT HOME, OR GET ALONG WITH OTHER PEOPLE: SOMEWHAT DIFFICULT
SUM OF ALL RESPONSES TO PHQ QUESTIONS 1-9: 6
SUM OF ALL RESPONSES TO PHQ QUESTIONS 1-9: 6

## 2024-06-20 ASSESSMENT — PAIN SCALES - GENERAL: PAINLEVEL: MILD PAIN (3)

## 2024-06-20 NOTE — PROGRESS NOTES
Answers submitted by the patient for this visit:  Patient Health Questionnaire (Submitted on 6/20/2024)  If you checked off any problems, how difficult have these problems made it for you to do your work, take care of things at home, or get along with other people?: Somewhat difficult  PHQ9 TOTAL SCORE: 6  Preventive Care Visit  [facility] DESIRAE Calderon MD, Internal Medicine - Pediatrics  Jun 20, 2024      Assessment & Plan       ICD-10-CM    1. Routine general medical examination at a health care facility  Z00.00 PRIMARY CARE FOLLOW-UP SCHEDULING     MA Screen Bilateral w/Randy     DX Bone Density     Comprehensive metabolic panel (BMP + Alb, Alk Phos, ALT, AST, Total. Bili, TP)     Lipid panel reflex to direct LDL Fasting     TSH with free T4 reflex     CBC with platelets     Vitamin D Deficiency'    [place] [employer] [place] and [place] to have labs and mammogram/dexa done there.  Order printed for her    Additional labs to look for cause of fatigue that has been bothering her recently      2. Major depressive disorder, recurrent episode, moderate (H)  F33.1 escitalopram (LEXAPRO) 20 MG tablet     buPROPion (WELLBUTRIN XL) 150 MG 24 hr tablet     buPROPion (WELLBUTRIN XL) 300 MG 24 hr tablet    Well controlled, continue current medications        3. SDH (subdural hematoma) (H)  S06.5XAA Now resolved, no further neurosurgical follow up required, no sequelae      4. Visit for screening mammogram  Z12.31 MA Screen Bilateral w/Randy      5. Asymptomatic postmenopausal status  Z78.0 DX Bone Density      6. Essential hypertension  I10 Comprehensive metabolic panel (BMP + Alb, Alk Phos, ALT, AST, Total. Bili, TP)     Lipid panel reflex to direct LDL Fasting     amLODIPine (NORVASC) 5 MG tablet    Well controlled, continue current medications        7. Gastroesophageal reflux disease with esophagitis without hemorrhage  K21.00 Remains on PPI - has follow up visit later this year with GI.  Discussed  "strategies for weaning PPI      8. Anxiety  F41.9 escitalopram (LEXAPRO) 20 MG tablet  Well controlled, continue current medications                BMI  Estimated body mass index is 29.8 kg/m  as calculated from the following:    Height as of this encounter: 1.611 m (5' 3.43\").    Weight as of this encounter: 77.3 kg (170 lb 8 oz).   Weight management plan: Discussed healthy diet and exercise guidelines    Counseling  Appropriate preventive services were discussed with this patient    See Patient Instructions    Subjective   Sanjuanita is a 63 year old, presenting for the following:  Physical        6/20/2024     8:50 AM   Additional Questions   Roomed by Jackson HUMPHREYS   Accompanied by Self         6/20/2024     8:50 AM   Patient Reported Additional Medications   Patient reports taking the following new medications No        Via the Health Maintenance questionnaire, the patient has reported the following services have been completed -Mammogram: Soren Harry 2023-03-31, this information has not been sent to the abstraction team.  Health Care Directive  Patient does not have a Health Care Directive or Living Will: Patient states has Advance Directive and will bring in a copy to clinic.    HPI        6/17/2024   General Health   How would you rate your overall physical health? Good   Feel stress (tense, anxious, or unable to sleep) Not at all            6/17/2024   Nutrition   Diet: Regular (no restrictions)            6/17/2024   Exercise   Days per week of moderate/strenous exercise 5 days   Average minutes spent exercising at this level 30 min            6/17/2024   Social Factors   Frequency of gathering with friends or relatives Once a week   Worry food won't last until get money to buy more No   Food not last or not have enough money for food? No   Do you have housing? (Housing is defined as stable permanent housing and does not include staying ouside in a car, in a tent, in an abandoned building, in an overnight shelter, " or couch-surfing.) Yes   Are you worried about losing your housing? No   Lack of transportation? No   Unable to get utilities (heat,electricity)? No            6/17/2024   Fall Risk   Fallen 2 or more times in the past year? No    No   Trouble with walking or balance? No    No       Multiple values from one day are sorted in reverse-chronological order          6/17/2024   Activities of Daily Living- Home Safety   Needs help with the following daily activites None of the above   Safety concerns in the home None of the above            6/17/2024   Dental   Dentist two times every year? Yes            6/17/2024   Hearing Screening   Hearing concerns? (!) I NEED TO ASK PEOPLE TO SPEAK UP OR REPEAT THEMSELVES.    (!) TROUBLE UNDERSTANDING SOFT OR WHISPERED SPEECH.       Multiple values from one day are sorted in reverse-chronological order         6/17/2024   Driving Risk Screening   Patient/family members have concerns about driving No            6/17/2024   General Alertness/Fatigue Screening   Have you been more tired than usual lately? (!) YES            6/17/2024   Urinary Incontinence Screening   Bothered by leaking urine in past 6 months No            6/17/2024   TB Screening   Were you born outside of the [place]? No          Today's PHQ-9 Score:       6/20/2024     8:49 AM   PHQ-9 SCORE   PHQ-9 Total Score MyChart 6 (Mild depression)   PHQ-9 Total Score 6         3/8/2023     9:37 AM 9/18/2023     2:38 PM 6/20/2024     8:49 AM   PHQ   PHQ-9 Total Score 2 1 6   Q9: Thoughts of better off dead/self-harm past 2 weeks Not at all Not at all Not at all           6/17/2024   Substance Use   Alcohol more than 3/day or more than 7/wk No   Do you have a current opioid prescription? No   How severe/bad is pain from 1 to 10? 0/10 (No Pain)   Do you use any other substances recreationally? No        Social History     Tobacco Use    Smoking status: Never    Smokeless tobacco: Never    Tobacco comments:     Never used tobacco    Vaping Use    Vaping status: Never Used   Substance Use Topics    Alcohol use: No    Drug use: No           3/31/2023   LAST FHS-7 RESULTS   1st degree relative breast or ovarian cancer Yes           Mammogram Screening - Mammogram every 1-2 years updated in Health Maintenance based on mutual decision making      History of abnormal Pap smear: No - age 65 or older with adequate negative prior screening test results (3 consecutive negative cytology results, 2 consecutive negative cotesting results, or 2 consecutive negative HrHPV test results within 10 years, with the most recent test occurring within the recommended screening interval for the test used)       ASCVD Risk   The 10-year ASCVD risk score (Rosa RAMIREZ, et al., 2019) is: 6.4%    Values used to calculate the score:      Age: 63 years      Sex: Female      Is Non- : No      Diabetic: No      Tobacco smoker: No      Systolic Blood Pressure: 117 mmHg      Is BP treated: Yes      HDL Cholesterol: 49 mg/dL      Total Cholesterol: 189 mg/dL      Reviewed and updated as needed this visit by Provider         Dion Dinh              Current providers sharing in care for this patient include:  Patient Care Team:  Bernice Calderon MD as PCP - General (Internal Medicine)  Bernice Calderon MD as Assigned PCP  Kellen Pisano NP as Assigned Neuroscience Provider  Nila Macdonald MD as Assigned OBGYN Provider  Gerda Chacon MD as MD (Family Medicine)  Ginette Santiago PA-C as Physician Assistant (Gastroenterology)  Ginette Santiago PA-C as Assigned Cancer Care Provider    The following health maintenance items are reviewed in Epic and correct as of today:  Health Maintenance   Topic Date Due    DEXA  Never done    RSV VACCINE (Pregnancy & 60+) (1 - 1-dose 60+ series) Never done    MAMMO SCREENING  03/31/2024    ANNUAL REVIEW OF HM ORDERS  09/18/2024    PHQ-9  12/20/2024    MEDICARE ANNUAL WELLNESS VISIT   "06/20/2025    FALL RISK ASSESSMENT  06/20/2025    GLUCOSE  09/18/2026    LIPID  06/28/2028    DTAP/TDAP/TD IMMUNIZATION (3 - Td or Tdap) 04/04/2029    ADVANCE CARE PLANNING  06/20/2029    COLORECTAL CANCER SCREENING  11/11/2029    DEPRESSION ACTION PLAN  Completed    INFLUENZA VACCINE  Completed    Pneumococcal Vaccine: 65+ Years  Completed    ZOSTER IMMUNIZATION  Completed    COVID-19 Vaccine  Completed    HEPATITIS C SCREENING  Addressed    HIV SCREENING  Addressed    HPV IMMUNIZATION  Aged Out    MENINGITIS IMMUNIZATION  Aged Out    RSV MONOCLONAL ANTIBODY  Aged Out    PAP  Discontinued    IPV IMMUNIZATION  Discontinued       HTN - controled on current medications, no new cardiac symptoms    Depression/anxiety - under control with current medications.  ? If new lexapro formulation is contributing to some newfound fatigue    Subdural hematoma - now resolved - no residual symptoms    Gerd - saw GI this spring - started on omeprazole and hasn't noticed a change in symptoms.  Has follow up appt scheduled.  Has been on PPI for 3 months - wondering about coming off    Recent horse riding injury - broken ribs after getting thrown into fence  - healing well    [facility] for [facility] trip    [place] [employer] [place]    Exercise - walking multiple times per week, gets a lot of steps at work         ROS: 10 point ROS neg other than the symptoms noted above in the HPI.       Objective    Exam  /79 (BP Location: Right arm, Patient Position: Sitting, Cuff Size: Adult Large)   Pulse 85   Temp 98.5  F (36.9  C) (Tympanic)   Resp 16   Ht 1.611 m (5' 3.43\")   Wt 77.3 kg (170 lb 8 oz)   LMP 01/01/2010   SpO2 97%   BMI 29.80 kg/m     Estimated body mass index is 29.8 kg/m  as calculated from the following:    Height as of this encounter: 1.611 m (5' 3.43\").    Weight as of this encounter: 77.3 kg (170 lb 8 oz).  Wt Readings from Last 4 Encounters:   06/20/24 77.3 kg (170 lb 8 oz)   11/01/23 76.2 kg (168 lb) "   09/18/23 76.2 kg (168 lb)   07/25/23 77.1 kg (170 lb)         Physical Exam  GENERAL: alert and no distress  EYES: Eyes grossly normal to inspection, PERRL and conjunctivae and sclerae normal  HENT: ear canals and TM's normal, nose and mouth without ulcers or lesions  NECK: no adenopathy, no asymmetry, masses, or scars  RESP: lungs clear to auscultation - no rales, rhonchi or wheezes  CV: regular rate and rhythm, normal S1 S2, no S3 or S4, no murmur, click or rub, no peripheral edema  ABDOMEN: soft, nontender, no hepatosplenomegaly, no masses and bowel sounds normal  MS: no gross musculoskeletal defects noted, no edema  SKIN: no suspicious lesions or rashes  NEURO: Normal strength and tone, mentation intact and speech normal  PSYCH: mentation appears normal, affect normal/bright        6/20/2024   Mini Cog   Clock Draw Score 2 Normal   3 Item Recall 3 objects recalled   Mini Cog Total Score 5              Signed Electronically by: Bernice Calderon MD

## 2024-06-20 NOTE — PATIENT INSTRUCTIONS
"Patient Education   Preventive Care Advice   This is general advice we often give to help people stay healthy. Your care team may have specific advice just for you. Please talk to your care team about your own preventive care needs.  Lifestyle  Exercise at least 150 minutes each week (30 minutes a day, 5 days a week).  Do muscle strengthening activities 2 days a week. These help control your weight and prevent disease.  No smoking.  Wear sunscreen to prevent skin cancer.  Have your home tested for radon every 2 to 5 years. Radon is a colorless, odorless gas that can harm your lungs. To learn more, go to www.health.[facility].[place].us and search for \"Radon in Homes.\"  Keep guns unloaded and locked up in a safe place like a safe or gun vault, or, use a gun lock and hide the keys. Always lock away bullets separately. To learn more, visit [employer].[place].gov and search for \"safe gun storage.\"  Nutrition  Eat 5 or more servings of fruits and vegetables each day.  Try wheat bread, brown rice and whole grain pasta (instead of white bread, rice, and pasta).  Get enough calcium and vitamin D. Check the label on foods and aim for 100% of the RDA (recommended daily allowance).  Regular exams  Have a dental exam and cleaning every 6 months.  See your health care team every year to talk about:  Any changes in your health.  Any medicines your care team has prescribed.  Preventive care, family planning, and ways to prevent chronic diseases.  Shots (vaccines)   HPV shots (up to age 26), if you've never had them before.  Hepatitis B shots (up to age 59), if you've never had them before.  COVID-19 shot: Get this shot when it's due.  Flu shot: Get a flu shot every year.  Tetanus shot: Get a tetanus shot every 10 years.  Pneumococcal, hepatitis A, and RSV shots: Ask your care team if you need these based on your risk.  Shingles shot (for age 50 and up).  General health tests  Diabetes screening:  Starting at age 35, Get screened for diabetes at least " every 3 years.  If you are younger than age 35, ask your care team if you should be screened for diabetes.  Cholesterol test: At age 39, start having a cholesterol test every 5 years, or more often if advised.  Bone density scan (DEXA): At age 50, ask your care team if you should have this scan for osteoporosis (brittle bones).  Hepatitis C: Get tested at least once in your life.  Abdominal aortic aneurysm screening: Talk to your doctor about having this screening if you:  Have ever smoked; and  Are biologically male; and  Are between the ages of 65 and 75.  STIs (sexually transmitted infections)  Before age 24: Ask your care team if you should be screened for STIs.  After age 24: Get screened for STIs if you're at risk. You are at risk for STIs (including HIV) if:  You are sexually active with more than one person.  You don't use condoms every time.  You or a partner was diagnosed with a sexually transmitted infection.  If you are at risk for HIV, ask about PrEP medicine to prevent HIV.  Get tested for HIV at least once in your life, whether you are at risk for HIV or not.  Cancer screening tests  Cervical cancer screening: If you have a cervix, begin getting regular cervical cancer screening tests at age 21. Most people who have regular screenings with normal results can stop after age 65. Talk about this with your provider.  Breast cancer scan (mammogram): If you've ever had breasts, begin having regular mammograms starting at age 40. This is a scan to check for breast cancer.  Colon cancer screening: It is important to start screening for colon cancer at age 45.  Have a colonoscopy test every 10 years (or more often if you're at risk) Or, ask your provider about stool tests like a FIT test every year or Cologuard test every 3 years.  To learn more about your testing options, visit: www.[facility]/459498.pdf.  For help making a decision, visit: sigrid/ja67527.  Prostate cancer screening test: If you have a  prostate and are age 55 to 69, ask your provider if you would benefit from a yearly prostate cancer screening test.  Lung cancer screening: If you are a current or former smoker age 50 to 80, ask your care team if ongoing lung cancer screenings are right for you.  For informational purposes only. Not to replace the advice of your health care provider. Copyright   2023 [facility]. All rights reserved. Clinically reviewed by the [facility] Transitions Program. [employer] [id number] - REV 04/24.

## 2024-09-27 ENCOUNTER — PATIENT OUTREACH (OUTPATIENT)
Dept: CARE COORDINATION | Facility: CLINIC | Age: 64
End: 2024-09-27
Payer: COMMERCIAL

## 2025-03-23 ENCOUNTER — HEALTH MAINTENANCE LETTER (OUTPATIENT)
Age: 65
End: 2025-03-23

## 2025-06-15 ENCOUNTER — HEALTH MAINTENANCE LETTER (OUTPATIENT)
Age: 65
End: 2025-06-15

## 2025-06-30 SDOH — HEALTH STABILITY: PHYSICAL HEALTH: ON AVERAGE, HOW MANY DAYS PER WEEK DO YOU ENGAGE IN MODERATE TO STRENUOUS EXERCISE (LIKE A BRISK WALK)?: 5 DAYS

## 2025-06-30 SDOH — HEALTH STABILITY: PHYSICAL HEALTH: ON AVERAGE, HOW MANY MINUTES DO YOU ENGAGE IN EXERCISE AT THIS LEVEL?: 40 MIN

## 2025-06-30 ASSESSMENT — SOCIAL DETERMINANTS OF HEALTH (SDOH): HOW OFTEN DO YOU GET TOGETHER WITH FRIENDS OR RELATIVES?: ONCE A WEEK

## 2025-07-02 ASSESSMENT — PATIENT HEALTH QUESTIONNAIRE - PHQ9
SUM OF ALL RESPONSES TO PHQ QUESTIONS 1-9: 5
10. IF YOU CHECKED OFF ANY PROBLEMS, HOW DIFFICULT HAVE THESE PROBLEMS MADE IT FOR YOU TO DO YOUR WORK, TAKE CARE OF THINGS AT HOME, OR GET ALONG WITH OTHER PEOPLE: SOMEWHAT DIFFICULT
SUM OF ALL RESPONSES TO PHQ QUESTIONS 1-9: 5

## 2025-07-03 ENCOUNTER — OFFICE VISIT (OUTPATIENT)
Dept: PEDIATRICS | Facility: CLINIC | Age: 65
End: 2025-07-03
Payer: COMMERCIAL

## 2025-07-03 VITALS
HEART RATE: 84 BPM | HEIGHT: 63 IN | RESPIRATION RATE: 14 BRPM | OXYGEN SATURATION: 98 % | DIASTOLIC BLOOD PRESSURE: 80 MMHG | WEIGHT: 172.8 LBS | BODY MASS INDEX: 30.62 KG/M2 | TEMPERATURE: 98.1 F | SYSTOLIC BLOOD PRESSURE: 119 MMHG

## 2025-07-03 DIAGNOSIS — Z00.00 ROUTINE GENERAL MEDICAL EXAMINATION AT A HEALTH CARE FACILITY: Primary | ICD-10-CM

## 2025-07-03 DIAGNOSIS — Z78.0 ASYMPTOMATIC POSTMENOPAUSAL STATUS: ICD-10-CM

## 2025-07-03 DIAGNOSIS — F33.1 MAJOR DEPRESSIVE DISORDER, RECURRENT EPISODE, MODERATE (H): ICD-10-CM

## 2025-07-03 DIAGNOSIS — F33.9 EPISODE OF RECURRENT MAJOR DEPRESSIVE DISORDER, UNSPECIFIED DEPRESSION EPISODE SEVERITY: ICD-10-CM

## 2025-07-03 DIAGNOSIS — F41.9 ANXIETY: ICD-10-CM

## 2025-07-03 DIAGNOSIS — Z12.31 VISIT FOR SCREENING MAMMOGRAM: ICD-10-CM

## 2025-07-03 DIAGNOSIS — I10 ESSENTIAL HYPERTENSION: ICD-10-CM

## 2025-07-03 DIAGNOSIS — H90.3 BILATERAL SENSORINEURAL HEARING LOSS: ICD-10-CM

## 2025-07-03 DIAGNOSIS — K21.00 GASTROESOPHAGEAL REFLUX DISEASE WITH ESOPHAGITIS WITHOUT HEMORRHAGE: ICD-10-CM

## 2025-07-03 RX ORDER — AMLODIPINE BESYLATE 5 MG/1
5 TABLET ORAL EVERY EVENING
Qty: 90 TABLET | Refills: 3 | Status: SHIPPED | OUTPATIENT
Start: 2025-07-03

## 2025-07-03 RX ORDER — BUPROPION HYDROCHLORIDE 300 MG/1
300 TABLET ORAL EVERY MORNING
Qty: 90 TABLET | Refills: 3 | Status: SHIPPED | OUTPATIENT
Start: 2025-07-03

## 2025-07-03 RX ORDER — ESCITALOPRAM OXALATE 20 MG/1
20 TABLET ORAL DAILY
Qty: 90 TABLET | Refills: 3 | Status: SHIPPED | OUTPATIENT
Start: 2025-07-03

## 2025-07-03 RX ORDER — BUPROPION HYDROCHLORIDE 150 MG/1
150 TABLET ORAL EVERY MORNING
Qty: 90 TABLET | Refills: 3 | Status: SHIPPED | OUTPATIENT
Start: 2025-07-03

## 2025-07-03 NOTE — LETTER
July 3, 2025      Sanjuanita Saul  [address] [id number]        To Whom It May Concern,           Sanjuanita Saul is a patient under my care.    Please accept the following laboratory orders with the diagnosis code Z00.00 (Routine Physical).  Please fax results to [phone].    Lipid panel reflex to direct LDL Fasting  Comprehensive Metabolic Panel  TSH with free T4 reflex  CBC with platelets  Vitamin D deficiency            Sincerely,        Bernice Calderon MD    Electronically signed

## 2025-07-03 NOTE — PROGRESS NOTES
Preventive Care Visit  [facility] DESIRAE Calderon MD, Internal Medicine - Pediatrics  Jul 3, 2025      Assessment & Plan       ICD-10-CM    1. Routine general medical examination at a health care facility  Z00.00 PRIMARY CARE FOLLOW-UP SCHEDULING     DX Bone Density     MA Screen Bilateral w/Randy    Immunizations and cancer screenings RG    Works at [employer] [facility] [employer] and prefers to have labs/imaging done there, so written orders provided for these studies.      2. Major depressive disorder, recurrent episode, moderate (H)  F33.1 buPROPion (WELLBUTRIN XL) 150 MG 24 hr tablet     buPROPion (WELLBUTRIN XL) 300 MG 24 hr tablet     escitalopram (LEXAPRO) 20 MG tablet    Stable, continue current medications.  Discussed using EA resources for counseling.      3. Essential hypertension  I10 amLODIPine (NORVASC) 5 MG tablet  Well controlled, continue current medications        4. Episode of recurrent major depressive disorder, unspecified depression episode severity  F33.9 See above      5. Asymptomatic postmenopausal status  Z78.0 DX Bone Density      6. Visit for screening mammogram  Z12.31 MA Screen Bilateral w/Randy      7. Gastroesophageal reflux disease with esophagitis without hemorrhage  K21.00 Well controlled, continue current medications        8. Bilateral sensorineural hearing loss  H90.3 Adult ENT  Referral    Interested in additional testing and considering hearing aids.  Noticing more difficulties with understanding phone and patient convesrsations      9. Anxiety  F41.9 escitalopram (LEXAPRO) 20 MG tablet  continue          The longitudinal plan of care for the diagnosis(es)/condition(s) as documented were addressed during this visit. Due to the added complexity in care, I will continue to support Sanjuanita in the subsequent management and with ongoing continuity of care.     BMI  Estimated body mass index is 30.2 kg/m  as calculated from the following:    Height as of this encounter: 1.611  "m (5' 3.43\").    Weight as of this encounter: 78.4 kg (172 lb 12.8 oz).   Weight management plan: Discussed healthy diet and exercise guidelines  Reviewed preventive health counseling, as reflected in patient instructions  Counseling  Appropriate preventive services were addressed with this patient via screening, questionnaire, or discussion as appropriate for fall prevention, nutrition, physical activity, Tobacco-use cessation, social engagement, weight loss and cognition.  Checklist reviewing preventive services available has been given to the patient.  Reviewed patient's diet, addressing concerns and/or questions.   She is at risk for psychosocial distress and has been provided with information to reduce risk.   Discussed possible causes of fatigue. The patient was provided with written information regarding signs of hearing loss.             Subjective   Sanjuanita is a 64 year old, presenting for the following:  Physical        7/3/2025     2:44 PM   Additional Questions   Roomed by HUDSON FARRIS        Via the Health Maintenance questionnaire, the patient has reported the following services have been completed -Mammogram: [facility] 2024-11-01, this information has been sent to the abstraction team.    HPI    Advance Care Planning    Discussed advance care planning with patient; informed AVS has link to [facility] [employer].        6/30/2025   General Health   How would you rate your overall physical health? Good   Feel stress (tense, anxious, or unable to sleep) To some extent   (!) STRESS CONCERN      6/30/2025   Nutrition   Diet: Regular (no restrictions)         6/30/2025   Exercise   Days per week of moderate/strenous exercise 5 days   Average minutes spent exercising at this level 40 min         6/30/2025   Social Factors   Frequency of gathering with friends or relatives Once a week   Worry food won't last until get money to buy more No   Food not last or not have enough money for food? No   Do you have " housing? (Housing is defined as stable permanent housing and does not include staying outside in a car, in a tent, in an abandoned building, in an overnight shelter, or couch-surfing.) Yes   Are you worried about losing your housing? No   Lack of transportation? No   Unable to get utilities (heat,electricity)? No         6/30/2025   Fall Risk   Fallen 2 or more times in the past year? No   Trouble with walking or balance? No          6/30/2025   Activities of Daily Living- Home Safety   Needs help with the following daily activites None of the above   Safety concerns in the home None of the above         6/30/2025   Dental   Dentist two times every year? Yes         6/30/2025   Hearing Screening   Hearing concerns? (!) I NEED TO ASK PEOPLE TO SPEAK UP OR REPEAT THEMSELVES.    (!) TROUBLE UNDERSTANDING SOFT OR WHISPERED SPEECH.   Would you like a referral for hearing testing? (!) YES       Multiple values from one day are sorted in reverse-chronological order         6/30/2025   Driving Risk Screening   Patient/family members have concerns about driving No         6/30/2025   General Alertness/Fatigue Screening   Have you been more tired than usual lately? (!) YES         6/30/2025   Urinary Incontinence Screening   Bothered by leaking urine in past 6 months No       Today's PHQ-9 Score:       7/2/2025     3:58 PM   PHQ-9 SCORE   PHQ-9 Total Score MyChart 5 (Mild depression)   PHQ-9 Total Score 5        Patient-reported         6/30/2025   Substance Use   Alcohol more than 3/day or more than 7/wk Not Applicable   Do you have a current opioid prescription? No   How severe/bad is pain from 1 to 10? 0/10 (No Pain)   Do you use any other substances recreationally? No     Social History     Tobacco Use    Smoking status: Never    Smokeless tobacco: Never    Tobacco comments:     Never used tobacco   Vaping Use    Vaping status: Never Used   Substance Use Topics    Alcohol use: No    Drug use: No           3/31/2023   LAST  FHS-7 RESULTS   1st degree relative breast or ovarian cancer Yes        Mammogram Screening - Mammogram every 1-2 years updated in Health Maintenance based on mutual decision making      History of abnormal Pap smear: No - age 65 or older with adequate negative prior screening test results (3 consecutive negative cytology results, 2 consecutive negative cotesting results, or 2 consecutive negative HrHPV test results within 10 years, with the most recent test occurring within the recommended screening interval for the test used)       ASCVD Risk   The 10-year ASCVD risk score (Rosa RAMIREZ, et al., 2019) is: 7.3%    Values used to calculate the score:      Age: 64 years      Sex: Female      Is Non- : No      Diabetic: No      Tobacco smoker: No      Systolic Blood Pressure: 119 mmHg      Is BP treated: Yes      HDL Cholesterol: 49 mg/dL      Total Cholesterol: 189 mg/dL        Reviewed and updated as needed this visit by Provider                      Current providers sharing in care for this patient include:  Patient Care Team:  Bernice Calderon MD as PCP - General (Internal Medicine)  Bernice Calderon MD as Assigned PCP  Gerda Chacon MD as MD (Family Medicine)  Ginette Santiago PA-C as Physician Assistant (Gastroenterology)    The following health maintenance items are reviewed in Epic and correct as of today:  Health Maintenance   Topic Date Due    DEXA  Never done    BMP  09/18/2024    ANNUAL REVIEW OF HM ORDERS  09/18/2024    INFLUENZA VACCINE (1) 09/01/2025    MAMMO SCREENING  11/01/2025    PHQ-9  01/03/2026    MEDICARE ANNUAL WELLNESS VISIT  07/03/2026    FALL RISK ASSESSMENT  07/03/2026    DIABETES SCREENING  09/18/2026    LIPID  06/28/2028    DTAP/TDAP/TD VACCINE (3 - Td or Tdap) 04/04/2029    ADVANCE CARE PLANNING  06/20/2029    COLORECTAL CANCER SCREENING  11/11/2029    RSV VACCINE (1 - 1-dose 75+ series) 10/06/2033    DEPRESSION ACTION PLAN  Completed  "   PNEUMOCOCCAL VACCINE 50+ YEARS  Completed    ZOSTER VACCINE  Completed    COVID-19 VACCINE  Completed    HEPATITIS C SCREENING  Addressed    HPV VACCINE  Aged Out    MENINGITIS VACCINE  Aged Out    PAP  Discontinued     HTN - controlled on current agents    Depression - distraught by current administration.   Meds are going ok.   Continues to find her work purposeful and enjoys spending time with her horse, Trey.    [place] [facility] [place] non-severe reflux and no Rendon's on EGD.  Followed also for constipation    Hearing - noticing more challenges.         ROS: 10 point ROS neg other than the symptoms noted above in the HPI.       Objective    Exam  /80 (BP Location: Right arm, Patient Position: Sitting, Cuff Size: Adult Large)   Pulse 84   Temp 98.1  F (36.7  C) (Tympanic)   Resp 14   Ht 1.611 m (5' 3.43\")   Wt 78.4 kg (172 lb 12.8 oz)   LMP 01/01/2010   SpO2 98%   BMI 30.20 kg/m     Estimated body mass index is 30.2 kg/m  as calculated from the following:    Height as of this encounter: 1.611 m (5' 3.43\").    Weight as of this encounter: 78.4 kg (172 lb 12.8 oz).  Wt Readings from Last 4 Encounters:   07/03/25 78.4 kg (172 lb 12.8 oz)   06/20/24 77.3 kg (170 lb 8 oz)   11/01/23 76.2 kg (168 lb)   09/18/23 76.2 kg (168 lb)         Physical Exam  GENERAL: alert and no distress  EYES: Eyes grossly normal to inspection, PERRL and conjunctivae and sclerae normal  HENT: ear canals and TM's normal, nose and mouth without ulcers or lesions  NECK: no adenopathy, no asymmetry, masses, or scars  RESP: lungs clear to auscultation - no rales, rhonchi or wheezes  CV: regular rate and rhythm, normal S1 S2, no S3 or S4, no murmur, click or rub, no peripheral edema  ABDOMEN: soft, nontender, no hepatosplenomegaly, no masses and bowel sounds normal  MS: no gross musculoskeletal defects noted, no edema  SKIN: no suspicious lesions or rashes  NEURO: Normal strength and tone, mentation intact " and speech normal  PSYCH: mentation appears normal, affect normal/bright        7/3/2025   Mini Cog   Clock Draw Score 2 Normal   3 Item Recall 3 objects recalled   Mini Cog Total Score 5             Signed Electronically by: Bernice Calderon MD    Answers submitted by the patient for this visit:  Patient Health Questionnaire (Submitted on 7/2/2025)  If you checked off any problems, how difficult have these problems made it for you to do your work, take care of things at home, or get along with other people?: Somewhat difficult  PHQ9 TOTAL SCORE: 5

## 2025-07-03 NOTE — PATIENT INSTRUCTIONS
Patient Education   Preventive Care Advice   This is general advice given by our system to help you stay healthy. However, your care team may have specific advice just for you. Please talk to your care team about your preventive care needs.  Nutrition  Eat 5 or more servings of fruits and vegetables each day.  Try wheat bread, brown rice and whole grain pasta (instead of white bread, rice, and pasta).  Get enough calcium and vitamin D. Check the label on foods and aim for 100% of the RDA (recommended daily allowance).  Lifestyle  Exercise at least 150 minutes each week  (30 minutes a day, 5 days a week).  Do muscle strengthening activities 2 days a week. These help control your weight and prevent disease.  No smoking.  Wear sunscreen to prevent skin cancer.  Have a dental exam and cleaning every 6 months.  Yearly exams  See your health care team every year to talk about:  Any changes in your health.  Any medicines your care team has prescribed.  Preventive care, family planning, and ways to prevent chronic diseases.  Shots (vaccines)   HPV shots (up to age 26), if you've never had them before.  Hepatitis B shots (up to age 59), if you've never had them before.  COVID-19 shot: Get this shot when it's due.  Flu shot: Get a flu shot every year.  Tetanus shot: Get a tetanus shot every 10 years.  Pneumococcal, hepatitis A, and RSV shots: Ask your care team if you need these based on your risk.  Shingles shot (for age 50 and up)  General health tests  Diabetes screening:  Starting at age 35, Get screened for diabetes at least every 3 years.  If you are younger than age 35, ask your care team if you should be screened for diabetes.  Cholesterol test: At age 39, start having a cholesterol test every 5 years, or more often if advised.  Bone density scan (DEXA): At age 50, ask your care team if you should have this scan for osteoporosis (brittle bones).  Hepatitis C: Get tested at least once in your life.  STIs (sexually  transmitted infections)  Before age 24: Ask your care team if you should be screened for STIs.  After age 24: Get screened for STIs if you're at risk. You are at risk for STIs (including HIV) if:  You are sexually active with more than one person.  You don't use condoms every time.  You or a partner was diagnosed with a sexually transmitted infection.  If you are at risk for HIV, ask about PrEP medicine to prevent HIV.  Get tested for HIV at least once in your life, whether you are at risk for HIV or not.  Cancer screening tests  Cervical cancer screening: If you have a cervix, begin getting regular cervical cancer screening tests starting at age 21.  Breast cancer scan (mammogram): If you've ever had breasts, begin having regular mammograms starting at age 40. This is a scan to check for breast cancer.  Colon cancer screening: It is important to start screening for colon cancer at age 45.  Have a colonoscopy test every 10 years (or more often if you're at risk) Or, ask your provider about stool tests like a FIT test every year or Cologuard test every 3 years.  To learn more about your testing options, visit:   .  For help making a decision, visit:   https://bit.ly/el54198.  Prostate cancer screening test: If you have a prostate, ask your care team if a prostate cancer screening test (PSA) at age 55 is right for you.  Lung cancer screening: If you are a current or former smoker ages 50 to 80, ask your care team if ongoing lung cancer screenings are right for you.  For informational purposes only. Not to replace the advice of your health care provider. Copyright   2023 [employer]. All rights reserved. Clinically reviewed by the [facility] Transitions Program. [employer] 317998 - REV 01/24.

## 2025-07-05 ENCOUNTER — PATIENT OUTREACH (OUTPATIENT)
Dept: CARE COORDINATION | Facility: CLINIC | Age: 65
End: 2025-07-05
Payer: COMMERCIAL

## 2025-07-07 ENCOUNTER — PATIENT OUTREACH (OUTPATIENT)
Dept: CARE COORDINATION | Facility: CLINIC | Age: 65
End: 2025-07-07
Payer: COMMERCIAL

## (undated) DEVICE — ENDO BITE BLOCK ADULT OLYMPUS LATEX FREE MAJ-1632

## (undated) DEVICE — KIT ENDO TURNOVER/PROCEDURE W/CLEAN A SCOPE LINERS 103888

## (undated) DEVICE — ENDO FORCEP ENDOJAW BIOPSY 2.8MMX160CM FB-220K

## (undated) RX ORDER — FENTANYL CITRATE 50 UG/ML
INJECTION, SOLUTION INTRAMUSCULAR; INTRAVENOUS
Status: DISPENSED
Start: 2023-11-16